# Patient Record
Sex: FEMALE | Race: WHITE | NOT HISPANIC OR LATINO | Employment: FULL TIME | URBAN - NONMETROPOLITAN AREA
[De-identification: names, ages, dates, MRNs, and addresses within clinical notes are randomized per-mention and may not be internally consistent; named-entity substitution may affect disease eponyms.]

---

## 2018-04-19 ENCOUNTER — HOSPITAL ENCOUNTER (OUTPATIENT)
Dept: ULTRASOUND IMAGING | Facility: HOSPITAL | Age: 43
Discharge: HOME | End: 2018-04-19
Payer: COMMERCIAL

## 2018-04-19 ENCOUNTER — TRANSCRIBE ORDERS (OUTPATIENT)
Dept: ADMINISTRATIVE | Facility: HOSPITAL | Age: 43
End: 2018-04-19

## 2018-04-19 DIAGNOSIS — O02.1 ABORTION, MISSED: ICD-10-CM

## 2018-04-19 DIAGNOSIS — O02.1 ABORTION, MISSED: Primary | ICD-10-CM

## 2018-04-19 DIAGNOSIS — Z34.90 PREGNANCY, UNSPECIFIED GESTATIONAL AGE: ICD-10-CM

## 2018-04-19 PROCEDURE — 76801 OB US < 14 WKS SINGLE FETUS: CPT

## 2018-04-27 ENCOUNTER — HOSPITAL ENCOUNTER (OUTPATIENT)
Dept: ULTRASOUND IMAGING | Facility: HOSPITAL | Age: 43
Discharge: HOME | End: 2018-04-27
Payer: COMMERCIAL

## 2018-04-27 DIAGNOSIS — Z34.90 PREGNANCY, UNSPECIFIED GESTATIONAL AGE: ICD-10-CM

## 2018-04-27 PROCEDURE — 76801 OB US < 14 WKS SINGLE FETUS: CPT

## 2018-05-01 ENCOUNTER — TRANSCRIBE ORDERS (OUTPATIENT)
Dept: ADMINISTRATIVE | Facility: HOSPITAL | Age: 43
End: 2018-05-01

## 2018-05-01 DIAGNOSIS — Z09 FOLLOW UP: Primary | ICD-10-CM

## 2018-05-02 ENCOUNTER — HOSPITAL ENCOUNTER (OUTPATIENT)
Dept: ULTRASOUND IMAGING | Facility: HOSPITAL | Age: 43
Discharge: HOME | End: 2018-05-02
Payer: COMMERCIAL

## 2018-05-02 DIAGNOSIS — Z09 FOLLOW UP: ICD-10-CM

## 2018-05-02 DIAGNOSIS — O02.1 MISSED ABORTION: ICD-10-CM

## 2018-05-02 PROCEDURE — 76801 OB US < 14 WKS SINGLE FETUS: CPT

## 2018-05-11 ENCOUNTER — TRANSCRIBE ORDERS (OUTPATIENT)
Dept: RADIOLOGY | Facility: MEDICAL CENTER | Age: 43
End: 2018-05-11

## 2018-05-11 ENCOUNTER — APPOINTMENT (OUTPATIENT)
Dept: LAB | Facility: MEDICAL CENTER | Age: 43
End: 2018-05-11
Payer: COMMERCIAL

## 2018-05-11 DIAGNOSIS — O02.1: Primary | ICD-10-CM

## 2018-05-11 DIAGNOSIS — O02.1: ICD-10-CM

## 2018-05-11 LAB — B-HCG SERPL-ACNC: 198 MIU/ML

## 2018-05-11 PROCEDURE — 36415 COLL VENOUS BLD VENIPUNCTURE: CPT

## 2018-05-11 PROCEDURE — 84702 CHORIONIC GONADOTROPIN TEST: CPT

## 2018-05-20 ENCOUNTER — TRANSCRIBE ORDERS (OUTPATIENT)
Dept: ADMINISTRATIVE | Facility: HOSPITAL | Age: 43
End: 2018-05-20

## 2018-05-20 ENCOUNTER — APPOINTMENT (OUTPATIENT)
Dept: LAB | Facility: HOSPITAL | Age: 43
End: 2018-05-20
Payer: COMMERCIAL

## 2018-05-20 DIAGNOSIS — O03.9 MISCARRIAGE: ICD-10-CM

## 2018-05-20 DIAGNOSIS — O03.9 MISCARRIAGE: Primary | ICD-10-CM

## 2018-05-20 LAB — B-HCG SERPL-ACNC: 41 MIU/ML

## 2018-05-20 PROCEDURE — 84702 CHORIONIC GONADOTROPIN TEST: CPT

## 2018-05-20 PROCEDURE — 36415 COLL VENOUS BLD VENIPUNCTURE: CPT

## 2018-05-27 ENCOUNTER — TRANSCRIBE ORDERS (OUTPATIENT)
Dept: ADMINISTRATIVE | Facility: HOSPITAL | Age: 43
End: 2018-05-27

## 2018-05-27 ENCOUNTER — APPOINTMENT (OUTPATIENT)
Dept: LAB | Facility: HOSPITAL | Age: 43
End: 2018-05-27
Payer: COMMERCIAL

## 2018-05-27 DIAGNOSIS — O02.1 MISSED ABORTION: ICD-10-CM

## 2018-05-27 DIAGNOSIS — O02.1 MISSED ABORTION: Primary | ICD-10-CM

## 2018-05-27 LAB — B-HCG SERPL-ACNC: 15 MIU/ML

## 2018-05-27 PROCEDURE — 36415 COLL VENOUS BLD VENIPUNCTURE: CPT

## 2018-05-27 PROCEDURE — 84702 CHORIONIC GONADOTROPIN TEST: CPT

## 2018-06-03 ENCOUNTER — TRANSCRIBE ORDERS (OUTPATIENT)
Dept: ADMINISTRATIVE | Facility: HOSPITAL | Age: 43
End: 2018-06-03

## 2018-06-03 ENCOUNTER — APPOINTMENT (OUTPATIENT)
Dept: LAB | Facility: HOSPITAL | Age: 43
End: 2018-06-03
Payer: COMMERCIAL

## 2018-06-03 DIAGNOSIS — Z3A.21 21 WEEKS GESTATION OF PREGNANCY: ICD-10-CM

## 2018-06-03 DIAGNOSIS — Z3A.21 21 WEEKS GESTATION OF PREGNANCY: Primary | ICD-10-CM

## 2018-06-03 LAB — B-HCG SERPL-ACNC: 5 MIU/ML

## 2018-06-03 PROCEDURE — 36415 COLL VENOUS BLD VENIPUNCTURE: CPT

## 2018-06-03 PROCEDURE — 84702 CHORIONIC GONADOTROPIN TEST: CPT

## 2018-06-06 ENCOUNTER — APPOINTMENT (OUTPATIENT)
Dept: LAB | Facility: HOSPITAL | Age: 43
End: 2018-06-06
Payer: COMMERCIAL

## 2018-06-06 ENCOUNTER — TRANSCRIBE ORDERS (OUTPATIENT)
Dept: ADMINISTRATIVE | Facility: HOSPITAL | Age: 43
End: 2018-06-06

## 2018-06-06 DIAGNOSIS — E28.2 POLYCYSTIC OVARIES: Primary | ICD-10-CM

## 2018-06-06 DIAGNOSIS — D68.59 PRIMARY HYPERCOAGULABLE STATE (HCC): ICD-10-CM

## 2018-06-06 DIAGNOSIS — Z11.59 SCREENING EXAMINATION FOR POLIOMYELITIS: ICD-10-CM

## 2018-06-06 DIAGNOSIS — E28.2 POLYCYSTIC OVARIES: ICD-10-CM

## 2018-06-06 DIAGNOSIS — Z11.8 SCREENING EXAMINATION FOR TRACHOMA: ICD-10-CM

## 2018-06-06 LAB
ABO GROUP BLD: NORMAL
ALBUMIN SERPL BCP-MCNC: 3.6 G/DL (ref 3.5–5)
ALP SERPL-CCNC: 53 U/L (ref 46–116)
ALT SERPL W P-5'-P-CCNC: 33 U/L (ref 12–78)
ANION GAP SERPL CALCULATED.3IONS-SCNC: 10 MMOL/L (ref 4–13)
AST SERPL W P-5'-P-CCNC: 16 U/L (ref 5–45)
BILIRUB SERPL-MCNC: 0.3 MG/DL (ref 0.2–1)
BLD GP AB SCN SERPL QL: NEGATIVE
BUN SERPL-MCNC: 11 MG/DL (ref 5–25)
CALCIUM SERPL-MCNC: 9.2 MG/DL (ref 8.3–10.1)
CHLORIDE SERPL-SCNC: 102 MMOL/L (ref 100–108)
CHOLEST SERPL-MCNC: 182 MG/DL (ref 50–200)
CO2 SERPL-SCNC: 27 MMOL/L (ref 21–32)
CREAT SERPL-MCNC: 0.65 MG/DL (ref 0.6–1.3)
EST. AVERAGE GLUCOSE BLD GHB EST-MCNC: 97 MG/DL
GFR SERPL CREATININE-BSD FRML MDRD: 108 ML/MIN/1.73SQ M
GLUCOSE P FAST SERPL-MCNC: 80 MG/DL (ref 65–99)
HBA1C MFR BLD: 5 % (ref 4.2–6.3)
HBV CORE AB SER QL: NORMAL
HBV SURFACE AB SER-ACNC: 22.2 MIU/ML
HCV AB SER QL: NORMAL
HDLC SERPL-MCNC: 48 MG/DL (ref 40–60)
INSULIN SERPL-ACNC: 32 MU/L (ref 3–25)
LDLC SERPL CALC-MCNC: 106 MG/DL (ref 0–100)
NONHDLC SERPL-MCNC: 134 MG/DL
POTASSIUM SERPL-SCNC: 4.4 MMOL/L (ref 3.5–5.3)
PROT SERPL-MCNC: 7.1 G/DL (ref 6.4–8.2)
RH BLD: POSITIVE
RUBV IGG SERPL IA-ACNC: >175 IU/ML
SODIUM SERPL-SCNC: 139 MMOL/L (ref 136–145)
SPECIMEN EXPIRATION DATE: NORMAL
TRIGL SERPL-MCNC: 139 MG/DL
VIT B12 SERPL-MCNC: 520 PG/ML (ref 100–900)

## 2018-06-06 PROCEDURE — 86850 RBC ANTIBODY SCREEN: CPT

## 2018-06-06 PROCEDURE — 86632 CHLAMYDIA IGM ANTIBODY: CPT

## 2018-06-06 PROCEDURE — 85306 CLOT INHIBIT PROT S FREE: CPT

## 2018-06-06 PROCEDURE — 86787 VARICELLA-ZOSTER ANTIBODY: CPT

## 2018-06-06 PROCEDURE — 84402 ASSAY OF FREE TESTOSTERONE: CPT

## 2018-06-06 PROCEDURE — 86645 CMV ANTIBODY IGM: CPT

## 2018-06-06 PROCEDURE — 86644 CMV ANTIBODY: CPT

## 2018-06-06 PROCEDURE — 82607 VITAMIN B-12: CPT

## 2018-06-06 PROCEDURE — 83498 ASY HYDROXYPROGESTERONE 17-D: CPT

## 2018-06-06 PROCEDURE — 86146 BETA-2 GLYCOPROTEIN ANTIBODY: CPT

## 2018-06-06 PROCEDURE — 83525 ASSAY OF INSULIN: CPT

## 2018-06-06 PROCEDURE — 80053 COMPREHEN METABOLIC PANEL: CPT

## 2018-06-06 PROCEDURE — 85670 THROMBIN TIME PLASMA: CPT

## 2018-06-06 PROCEDURE — 85705 THROMBOPLASTIN INHIBITION: CPT

## 2018-06-06 PROCEDURE — 87389 HIV-1 AG W/HIV-1&-2 AB AG IA: CPT

## 2018-06-06 PROCEDURE — 87491 CHLMYD TRACH DNA AMP PROBE: CPT

## 2018-06-06 PROCEDURE — 86900 BLOOD TYPING SEROLOGIC ABO: CPT

## 2018-06-06 PROCEDURE — 36415 COLL VENOUS BLD VENIPUNCTURE: CPT

## 2018-06-06 PROCEDURE — 86762 RUBELLA ANTIBODY: CPT

## 2018-06-06 PROCEDURE — 80061 LIPID PANEL: CPT

## 2018-06-06 PROCEDURE — 85303 CLOT INHIBIT PROT C ACTIVITY: CPT

## 2018-06-06 PROCEDURE — 86592 SYPHILIS TEST NON-TREP QUAL: CPT

## 2018-06-06 PROCEDURE — 85732 THROMBOPLASTIN TIME PARTIAL: CPT

## 2018-06-06 PROCEDURE — 87661 TRICHOMONAS VAGINALIS AMPLIF: CPT

## 2018-06-06 PROCEDURE — 83036 HEMOGLOBIN GLYCOSYLATED A1C: CPT

## 2018-06-06 PROCEDURE — 87591 N.GONORRHOEAE DNA AMP PROB: CPT

## 2018-06-06 PROCEDURE — 84403 ASSAY OF TOTAL TESTOSTERONE: CPT

## 2018-06-06 PROCEDURE — 86704 HEP B CORE ANTIBODY TOTAL: CPT

## 2018-06-06 PROCEDURE — 82627 DEHYDROEPIANDROSTERONE: CPT

## 2018-06-06 PROCEDURE — 86147 CARDIOLIPIN ANTIBODY EA IG: CPT

## 2018-06-06 PROCEDURE — 86901 BLOOD TYPING SEROLOGIC RH(D): CPT

## 2018-06-06 PROCEDURE — 86706 HEP B SURFACE ANTIBODY: CPT

## 2018-06-06 PROCEDURE — 81241 F5 GENE: CPT

## 2018-06-06 PROCEDURE — 86803 HEPATITIS C AB TEST: CPT

## 2018-06-06 PROCEDURE — 85613 RUSSELL VIPER VENOM DILUTED: CPT

## 2018-06-06 PROCEDURE — 86790 VIRUS ANTIBODY NOS: CPT

## 2018-06-06 PROCEDURE — 85300 ANTITHROMBIN III ACTIVITY: CPT

## 2018-06-06 PROCEDURE — 85210 CLOT FACTOR II PROTHROM SPEC: CPT

## 2018-06-07 LAB
CARDIOLIPIN IGA SER IA-ACNC: <9 APL U/ML (ref 0–11)
CARDIOLIPIN IGG SER IA-ACNC: <9 GPL U/ML (ref 0–14)
CARDIOLIPIN IGM SER IA-ACNC: <9 MPL U/ML (ref 0–12)
CHLAMYDIA DNA CVX QL NAA+PROBE: NORMAL
CMV IGG SERPL IA-ACNC: <0.6 U/ML (ref 0–0.59)
CMV IGM SERPL IA-ACNC: <30 AU/ML (ref 0–29.9)
DEPRECATED AT III PPP: 97 % OF NORMAL (ref 92–136)
DHEA-S SERPL-MCNC: 226.9 UG/DL (ref 41.2–243.7)
HTLV I+II AB SER QL IA: NEGATIVE
N GONORRHOEA DNA GENITAL QL NAA+PROBE: NORMAL
PROT C AG ACT/NOR PPP IA: 137 % OF NORMAL (ref 60–150)
RPR SER QL: NORMAL
RUBV IGM SER IA-ACNC: <20 AU/ML (ref 0–19.9)
TESTOST FREE SERPL-MCNC: 2.8 PG/ML (ref 0–4.2)
TESTOST SERPL-MCNC: 43 NG/DL (ref 8–48)
VZV IGG SER IA-ACNC: NORMAL
VZV IGM SER IA-ACNC: <0.91 INDEX (ref 0–0.9)

## 2018-06-08 LAB
17OHP SERPL-MCNC: 41 NG/DL
B2 GLYCOPROT1 IGA SER-ACNC: <9 GPI IGA UNITS (ref 0–25)
B2 GLYCOPROT1 IGG SER-ACNC: <9 GPI IGG UNITS (ref 0–20)
B2 GLYCOPROT1 IGM SER-ACNC: <9 GPI IGM UNITS (ref 0–32)
C TRACH IGM TITR SER IF: <0.8 INDEX (ref 0–0.7)
HIV 1+2 AB+HIV1 P24 AG SERPL QL IA: NORMAL
PROT S ACT/NOR PPP: 95 % (ref 63–140)
PROTHROM ACT/NOR PPP: 118 % (ref 50–154)

## 2018-06-10 LAB — T VAGINALIS RRNA SPEC QL NAA+PROBE: NEGATIVE

## 2018-06-11 LAB
APTT SCREEN TO CONFIRM RATIO: 1.09 RATIO (ref 0–1.4)
CONFIRM APTT/NORMAL: 36.6 SEC (ref 0–55)
F5 GENE MUT ANL BLD/T: NORMAL
LA PPP-IMP: NORMAL
SCREEN APTT: 35.5 SEC (ref 0–51.9)
SCREEN DRVVT: 37.4 SEC (ref 0–47)
THROMBIN TIME: 16.2 SEC (ref 0–23)

## 2018-10-11 ENCOUNTER — TRANSCRIBE ORDERS (OUTPATIENT)
Dept: ADMINISTRATIVE | Facility: HOSPITAL | Age: 43
End: 2018-10-11

## 2018-10-11 DIAGNOSIS — R52 PAIN: Primary | ICD-10-CM

## 2018-10-18 ENCOUNTER — HOSPITAL ENCOUNTER (OUTPATIENT)
Dept: MRI IMAGING | Facility: HOSPITAL | Age: 43
Discharge: HOME | End: 2018-10-18
Payer: COMMERCIAL

## 2018-10-18 DIAGNOSIS — R52 PAIN: ICD-10-CM

## 2018-10-18 PROCEDURE — 72197 MRI PELVIS W/O & W/DYE: CPT

## 2018-10-18 PROCEDURE — A9585 GADOBUTROL INJECTION: HCPCS | Performed by: RADIOLOGY

## 2018-10-18 RX ADMIN — GADOBUTROL 8 ML: 604.72 INJECTION INTRAVENOUS at 18:34

## 2020-02-25 ENCOUNTER — TRANSCRIBE ORDERS (OUTPATIENT)
Dept: ADMINISTRATIVE | Facility: HOSPITAL | Age: 45
End: 2020-02-25

## 2020-02-25 ENCOUNTER — APPOINTMENT (OUTPATIENT)
Dept: LAB | Facility: HOSPITAL | Age: 45
End: 2020-02-25
Payer: COMMERCIAL

## 2020-02-25 DIAGNOSIS — O09.92 SUPERVISION OF HIGH RISK PREGNANCY IN SECOND TRIMESTER: Primary | ICD-10-CM

## 2020-02-25 DIAGNOSIS — O09.92 SUPERVISION OF HIGH RISK PREGNANCY IN SECOND TRIMESTER: ICD-10-CM

## 2020-02-25 LAB
ERYTHROCYTE [DISTWIDTH] IN BLOOD BY AUTOMATED COUNT: 12.7 % (ref 11.6–15.1)
GLUCOSE 1H P 50 G GLC PO SERPL-MCNC: 167 MG/DL
HCT VFR BLD AUTO: 37.3 % (ref 34.8–46.1)
HGB BLD-MCNC: 12.1 G/DL (ref 11.5–15.4)
MCH RBC QN AUTO: 31.6 PG (ref 26.8–34.3)
MCHC RBC AUTO-ENTMCNC: 32.4 G/DL (ref 31.4–37.4)
MCV RBC AUTO: 97 FL (ref 82–98)
PLATELET # BLD AUTO: 249 THOUSANDS/UL (ref 149–390)
PMV BLD AUTO: 9.5 FL (ref 8.9–12.7)
RBC # BLD AUTO: 3.83 MILLION/UL (ref 3.81–5.12)
RPR SER QL: NORMAL
WBC # BLD AUTO: 10.32 THOUSAND/UL (ref 4.31–10.16)

## 2020-02-25 PROCEDURE — 85027 COMPLETE CBC AUTOMATED: CPT

## 2020-02-25 PROCEDURE — 86592 SYPHILIS TEST NON-TREP QUAL: CPT

## 2020-02-25 PROCEDURE — 36415 COLL VENOUS BLD VENIPUNCTURE: CPT

## 2020-02-25 PROCEDURE — 82950 GLUCOSE TEST: CPT

## 2020-03-02 ENCOUNTER — OFFICE VISIT (OUTPATIENT)
Dept: FAMILY MEDICINE CLINIC | Facility: CLINIC | Age: 45
End: 2020-03-02
Payer: COMMERCIAL

## 2020-03-02 VITALS
HEIGHT: 70 IN | DIASTOLIC BLOOD PRESSURE: 90 MMHG | SYSTOLIC BLOOD PRESSURE: 162 MMHG | BODY MASS INDEX: 34.36 KG/M2 | WEIGHT: 240 LBS

## 2020-03-02 DIAGNOSIS — Z34.90 INTRAUTERINE PREGNANCY: Primary | ICD-10-CM

## 2020-03-02 DIAGNOSIS — I10 ESSENTIAL HYPERTENSION: ICD-10-CM

## 2020-03-02 PROCEDURE — 1036F TOBACCO NON-USER: CPT | Performed by: FAMILY MEDICINE

## 2020-03-02 PROCEDURE — 3077F SYST BP >= 140 MM HG: CPT | Performed by: FAMILY MEDICINE

## 2020-03-02 PROCEDURE — 3008F BODY MASS INDEX DOCD: CPT | Performed by: FAMILY MEDICINE

## 2020-03-02 PROCEDURE — 3080F DIAST BP >= 90 MM HG: CPT | Performed by: FAMILY MEDICINE

## 2020-03-02 PROCEDURE — 99213 OFFICE O/P EST LOW 20 MIN: CPT | Performed by: FAMILY MEDICINE

## 2020-03-02 RX ORDER — ASCORBIC ACID 500 MG
TABLET ORAL
COMMUNITY

## 2020-03-02 RX ORDER — LABETALOL 100 MG/1
100 TABLET, FILM COATED ORAL 2 TIMES DAILY
COMMUNITY
Start: 2019-11-11 | End: 2021-08-17

## 2020-03-02 RX ORDER — MONTELUKAST SODIUM 10 MG/1
1 TABLET ORAL DAILY
COMMUNITY
End: 2020-03-02 | Stop reason: ALTCHOICE

## 2020-03-02 RX ORDER — PNV NO.121/IRON/FOLIC ACID 28MG-0.8MG
1 TABLET ORAL DAILY
COMMUNITY
End: 2020-03-02

## 2020-03-02 RX ORDER — UBIDECARENONE 10 MG
600 CAPSULE ORAL DAILY
COMMUNITY

## 2020-03-02 RX ORDER — DOXYCYCLINE HYCLATE 100 MG/1
CAPSULE ORAL
COMMUNITY
Start: 2013-03-04 | End: 2020-03-02

## 2020-03-02 RX ORDER — PNV NO.95/FERROUS FUM/FOLIC AC 28MG-0.8MG
2 TABLET ORAL DAILY
COMMUNITY

## 2020-03-02 RX ORDER — UREA 10 %
400 LOTION (ML) TOPICAL DAILY
COMMUNITY

## 2020-03-02 RX ORDER — MOMETASONE FUROATE 50 UG/1
2 SPRAY, METERED NASAL DAILY
COMMUNITY
Start: 2012-10-01 | End: 2020-03-02 | Stop reason: ALTCHOICE

## 2020-03-02 RX ORDER — LEVOTHYROXINE SODIUM 88 UG/1
88 TABLET ORAL DAILY
COMMUNITY
Start: 2019-11-26

## 2020-03-02 RX ORDER — PNV NO.95/FERROUS FUM/FOLIC AC 28MG-0.8MG
1 TABLET ORAL DAILY
COMMUNITY

## 2020-03-02 RX ORDER — VALSARTAN AND HYDROCHLOROTHIAZIDE 80; 12.5 MG/1; MG/1
1 TABLET, FILM COATED ORAL DAILY
COMMUNITY
Start: 2012-10-01 | End: 2020-03-02

## 2020-03-02 NOTE — PROGRESS NOTES
Assessment/Plan:  Patient will continue checking her blood pressure she has already watching her salt she exercises every day on an elliptical so she is doing all the right things on she does have any symptoms of edema I told her to continue close follow-up on her blood pressure and again her OB does get a readings on a weekly basis so they are aware of this not going to change any of her medications at the present time and I actually would prefer 0 be due the change if there is any necessary in her labetalol dosage    Problem List Items Addressed This Visit     None      Visit Diagnoses     Intrauterine pregnancy    -  Primary    Essential hypertension               Diagnoses and all orders for this visit:    Intrauterine pregnancy    Essential hypertension        No problem-specific Assessment & Plan notes found for this encounter  Subjective:      Patient ID: Selam Monzon is a 50 y o  female  Patient here for a routine blood pressure checkup she was concerned because her blood pressures have been up a little bit recently but she is actually following with her Ob group and checking her pressures twice daily at home her blood pressures are better than they are here there 140 over her 88 or so today's blood pressure slightly elevated and after we talked for a while pressure is starting to come down she does not have any symptoms of preeclampsia she has no edema and has no headaches and to the best of my knowledge is not having any protein in her urine her OB checks that this is a patient who has had infertility issues and this is a beginning trimester intrauterine pregnancy with complications of a fibroid uterus      The following portions of the patient's history were reviewed and updated as appropriate:   She has no past medical history on file  ,  does not have a problem list on file  ,   has no past surgical history on file  ,  family history is not on file  ,   reports that she has never smoked  She has never used smokeless tobacco  She reports that she drank alcohol  Her drug history is not on file  ,  is allergic to aspirin; other; pollen extract; and molds & smuts     Current Outpatient Medications   Medication Sig Dispense Refill    ascorbic acid (VITAMIN C) 500 mg tablet Take by mouth      ASPIRIN 81 PO Take 81 mg by mouth daily       Coenzyme Q10 10 MG capsule Take 600 mg by mouth daily      folic acid (FOLVITE) 216 MCG tablet Take 400 mcg by mouth daily      labetalol (NORMODYNE) 100 mg tablet Take 100 mg by mouth 2 (two) times a day      levothyroxine 88 mcg tablet Take 88 mcg by mouth daily      Omega-3 Fatty Acids (FISH OIL OMEGA-3) 1000 MG CAPS Take 2 g by mouth daily      Prenatal Vit-Fe Fumarate-FA (PRENATAL VITAMIN) 27-0 8 MG TABS Take 1 tablet by mouth daily       No current facility-administered medications for this visit  Review of Systems   Constitutional: Negative for activity change, appetite change, diaphoresis, fatigue and fever  HENT: Negative  Eyes: Negative  Respiratory: Negative for apnea, cough, chest tightness, shortness of breath and wheezing  Cardiovascular: Negative for chest pain, palpitations and leg swelling  Gastrointestinal: Negative for abdominal distention, abdominal pain, anal bleeding, constipation, diarrhea, nausea and vomiting  Endocrine: Negative for cold intolerance, heat intolerance, polydipsia, polyphagia and polyuria  Genitourinary: Negative for difficulty urinating, dysuria, flank pain, hematuria and urgency  Musculoskeletal: Negative for arthralgias, back pain, gait problem, joint swelling and myalgias  Skin: Negative for color change, rash and wound  Allergic/Immunologic: Negative for environmental allergies, food allergies and immunocompromised state  Neurological: Negative for dizziness, seizures, syncope, speech difficulty, numbness and headaches  Hematological: Negative for adenopathy   Does not bruise/bleed easily  Psychiatric/Behavioral: Negative for agitation, behavioral problems, hallucinations, sleep disturbance and suicidal ideas  Objective:  Vitals:    03/02/20 1057   BP: 162/90   BP Location: Left arm   Patient Position: Sitting   Cuff Size: Standard   Weight: 109 kg (240 lb)   Height: 5' 10" (1 778 m)     Body mass index is 34 44 kg/m²  Physical Exam   Constitutional: She is oriented to person, place, and time  She appears well-developed and well-nourished  No distress  HENT:   Head: Normocephalic  Right Ear: External ear normal    Left Ear: External ear normal    Nose: Nose normal    Mouth/Throat: Oropharynx is clear and moist    Eyes: Pupils are equal, round, and reactive to light  Conjunctivae and EOM are normal  Right eye exhibits no discharge  Left eye exhibits no discharge  No scleral icterus  Neck: Normal range of motion  No tracheal deviation present  No thyromegaly present  Cardiovascular: Normal rate, regular rhythm and normal heart sounds  Exam reveals no gallop and no friction rub  No murmur heard  Pulmonary/Chest: Effort normal and breath sounds normal  No respiratory distress  She has no wheezes  Abdominal: Soft  Bowel sounds are normal  She exhibits no mass  There is no tenderness  There is no guarding  Musculoskeletal: She exhibits no edema or deformity  Lymphadenopathy:     She has no cervical adenopathy  Neurological: She is alert and oriented to person, place, and time  No cranial nerve deficit  Skin: Skin is warm and dry  No rash noted  She is not diaphoretic  No erythema  Psychiatric: She has a normal mood and affect   Thought content normal

## 2020-03-03 ENCOUNTER — APPOINTMENT (OUTPATIENT)
Dept: LAB | Facility: HOSPITAL | Age: 45
End: 2020-03-03
Payer: COMMERCIAL

## 2020-03-03 ENCOUNTER — TRANSCRIBE ORDERS (OUTPATIENT)
Dept: ADMINISTRATIVE | Facility: HOSPITAL | Age: 45
End: 2020-03-03

## 2020-03-03 DIAGNOSIS — O99.810 ABNORMAL GLUCOSE IN PREGNANCY, ANTEPARTUM: Primary | ICD-10-CM

## 2020-03-03 DIAGNOSIS — O99.810 ABNORMAL GLUCOSE IN PREGNANCY, ANTEPARTUM: ICD-10-CM

## 2020-03-03 LAB
GLUCOSE 1H P 100 G GLC PO SERPL-MCNC: 231 MG/DL (ref 65–179)
GLUCOSE 2H P 100 G GLC PO SERPL-MCNC: 193 MG/DL (ref 65–154)
GLUCOSE 3H P 100 G GLC PO SERPL-MCNC: 83 MG/DL (ref 65–139)
GLUCOSE P FAST SERPL-MCNC: 86 MG/DL (ref 65–99)

## 2020-03-03 PROCEDURE — 82952 GTT-ADDED SAMPLES: CPT

## 2020-03-03 PROCEDURE — 82951 GLUCOSE TOLERANCE TEST (GTT): CPT

## 2020-03-03 PROCEDURE — 36415 COLL VENOUS BLD VENIPUNCTURE: CPT

## 2020-03-25 ENCOUNTER — TRANSCRIBE ORDERS (OUTPATIENT)
Dept: ADMINISTRATIVE | Facility: HOSPITAL | Age: 45
End: 2020-03-25

## 2020-03-25 ENCOUNTER — APPOINTMENT (OUTPATIENT)
Dept: LAB | Facility: HOSPITAL | Age: 45
End: 2020-03-25
Payer: COMMERCIAL

## 2020-03-25 DIAGNOSIS — O99.280 MATERNAL THYROID DYSFUNCTION, ANTEPARTUM: Primary | ICD-10-CM

## 2020-03-25 DIAGNOSIS — E07.9 MATERNAL THYROID DYSFUNCTION, ANTEPARTUM: Primary | ICD-10-CM

## 2020-03-25 DIAGNOSIS — E03.9 MYXEDEMA HEART DISEASE: ICD-10-CM

## 2020-03-25 DIAGNOSIS — I51.9 MYXEDEMA HEART DISEASE: ICD-10-CM

## 2020-03-25 DIAGNOSIS — O99.280 MATERNAL THYROID DYSFUNCTION, ANTEPARTUM: ICD-10-CM

## 2020-03-25 DIAGNOSIS — E07.9 MATERNAL THYROID DYSFUNCTION, ANTEPARTUM: ICD-10-CM

## 2020-03-25 LAB — TSH SERPL DL<=0.05 MIU/L-ACNC: 1.6 UIU/ML (ref 0.36–3.74)

## 2020-03-25 PROCEDURE — 36415 COLL VENOUS BLD VENIPUNCTURE: CPT

## 2020-03-25 PROCEDURE — 84443 ASSAY THYROID STIM HORMONE: CPT

## 2020-06-20 ENCOUNTER — TRANSCRIBE ORDERS (OUTPATIENT)
Dept: ADMINISTRATIVE | Facility: HOSPITAL | Age: 45
End: 2020-06-20

## 2020-06-20 ENCOUNTER — APPOINTMENT (OUTPATIENT)
Dept: LAB | Facility: HOSPITAL | Age: 45
End: 2020-06-20
Payer: COMMERCIAL

## 2020-06-20 DIAGNOSIS — E03.9 PRIMARY HYPOTHYROIDISM: ICD-10-CM

## 2020-06-20 DIAGNOSIS — E03.9 PRIMARY HYPOTHYROIDISM: Primary | ICD-10-CM

## 2020-06-20 LAB — TSH SERPL DL<=0.05 MIU/L-ACNC: 1.75 UIU/ML (ref 0.36–3.74)

## 2020-06-20 PROCEDURE — 84443 ASSAY THYROID STIM HORMONE: CPT

## 2020-06-20 PROCEDURE — 36415 COLL VENOUS BLD VENIPUNCTURE: CPT

## 2020-06-28 ENCOUNTER — TRANSCRIBE ORDERS (OUTPATIENT)
Dept: ADMINISTRATIVE | Facility: HOSPITAL | Age: 45
End: 2020-06-28

## 2020-06-28 ENCOUNTER — APPOINTMENT (OUTPATIENT)
Dept: LAB | Facility: HOSPITAL | Age: 45
End: 2020-06-28
Payer: COMMERCIAL

## 2020-06-28 DIAGNOSIS — Z86.32 HISTORY OF GESTATIONAL DIABETES: ICD-10-CM

## 2020-06-28 DIAGNOSIS — Z86.32 HISTORY OF GESTATIONAL DIABETES: Primary | ICD-10-CM

## 2020-06-28 LAB
GLUCOSE 2H P 75 G GLC PO SERPL-MCNC: 96 MG/DL (ref 65–139)
GLUCOSE P FAST SERPL-MCNC: 94 MG/DL (ref 65–99)
GLUCOSE SERPL-MCNC: 161 MG/DL (ref 65–140)

## 2020-06-28 PROCEDURE — 82947 ASSAY GLUCOSE BLOOD QUANT: CPT

## 2020-06-28 PROCEDURE — 36415 COLL VENOUS BLD VENIPUNCTURE: CPT

## 2020-06-28 PROCEDURE — 82950 GLUCOSE TEST: CPT

## 2020-11-02 ENCOUNTER — IMMUNIZATIONS (OUTPATIENT)
Dept: FAMILY MEDICINE CLINIC | Facility: CLINIC | Age: 45
End: 2020-11-02
Payer: COMMERCIAL

## 2020-11-02 DIAGNOSIS — Z23 NEED FOR INFLUENZA VACCINATION: Primary | ICD-10-CM

## 2020-11-02 PROCEDURE — 90686 IIV4 VACC NO PRSV 0.5 ML IM: CPT | Performed by: FAMILY MEDICINE

## 2020-11-02 PROCEDURE — 90471 IMMUNIZATION ADMIN: CPT | Performed by: FAMILY MEDICINE

## 2021-03-29 ENCOUNTER — TELEMEDICINE (OUTPATIENT)
Dept: FAMILY MEDICINE CLINIC | Facility: CLINIC | Age: 46
End: 2021-03-29
Payer: COMMERCIAL

## 2021-03-29 VITALS — HEIGHT: 70 IN | TEMPERATURE: 97.5 F | WEIGHT: 220 LBS | BODY MASS INDEX: 31.5 KG/M2

## 2021-03-29 DIAGNOSIS — J30.1 SEASONAL ALLERGIC RHINITIS DUE TO POLLEN: Primary | ICD-10-CM

## 2021-03-29 DIAGNOSIS — J01.90 ACUTE SINUSITIS WITH SYMPTOMS > 10 DAYS: ICD-10-CM

## 2021-03-29 DIAGNOSIS — Z12.31 ENCOUNTER FOR SCREENING MAMMOGRAM FOR MALIGNANT NEOPLASM OF BREAST: ICD-10-CM

## 2021-03-29 PROCEDURE — 3008F BODY MASS INDEX DOCD: CPT | Performed by: FAMILY MEDICINE

## 2021-03-29 PROCEDURE — 99213 OFFICE O/P EST LOW 20 MIN: CPT | Performed by: FAMILY MEDICINE

## 2021-03-29 PROCEDURE — 1036F TOBACCO NON-USER: CPT | Performed by: FAMILY MEDICINE

## 2021-03-29 RX ORDER — CARVEDILOL 25 MG/1
25 TABLET ORAL 2 TIMES DAILY WITH MEALS
COMMUNITY

## 2021-03-29 RX ORDER — LISINOPRIL 5 MG/1
10 TABLET ORAL 2 TIMES DAILY
COMMUNITY

## 2021-03-29 RX ORDER — PREDNISONE 10 MG/1
TABLET ORAL
Qty: 10 TABLET | Refills: 0 | Status: SHIPPED | OUTPATIENT
Start: 2021-03-29

## 2021-03-29 RX ORDER — FLUTICASONE PROPIONATE 50 MCG
1 SPRAY, SUSPENSION (ML) NASAL DAILY
Qty: 1 BOTTLE | Refills: 3 | Status: SHIPPED | OUTPATIENT
Start: 2021-03-29

## 2021-03-29 RX ORDER — AMOXICILLIN AND CLAVULANATE POTASSIUM 875; 125 MG/1; MG/1
1 TABLET, FILM COATED ORAL EVERY 12 HOURS SCHEDULED
Qty: 14 TABLET | Refills: 0 | Status: SHIPPED | OUTPATIENT
Start: 2021-03-29 | End: 2021-04-06 | Stop reason: SDUPTHER

## 2021-03-29 RX ORDER — ACETAMINOPHEN AND CODEINE PHOSPHATE 120; 12 MG/5ML; MG/5ML
0.35 SOLUTION ORAL DAILY
COMMUNITY
Start: 2020-06-03 | End: 2021-06-03

## 2021-03-29 RX ORDER — FEXOFENADINE HCL 180 MG/1
180 TABLET ORAL DAILY
Qty: 30 TABLET | Refills: 3 | Status: SHIPPED | OUTPATIENT
Start: 2021-03-29

## 2021-03-29 NOTE — PROGRESS NOTES
Virtual Regular Visit      Assessment/Plan:    Problem List Items Addressed This Visit     None      Visit Diagnoses     Encounter for screening mammogram for malignant neoplasm of breast    -  Primary    Relevant Orders    Mammo screening bilateral w 3d & cad               Reason for visit is   Chief Complaint   Patient presents with    Nasal Congestion        Encounter provider Junior Queen DO    Provider located at [address] [address]      Recent Visits  No visits were found meeting these conditions  Showing recent visits within past 7 days and meeting all other requirements     Today's Visits  Date Type Provider Dept   03/29/21 Telemedicine Junior Queen DO [facility]   Showing today's visits and meeting all other requirements     Future Appointments  No visits were found meeting these conditions  Showing future appointments within next 150 days and meeting all other requirements        The patient was identified by name and date of birth  Lokesh Gaona was informed that this is a telemedicine visit and that the visit is being conducted through [employer] and patient was informed that this is not a secure, HIPAA-compliant platform  She agrees to proceed     My office door was closed  No one else was in the room  She acknowledged consent and understanding of privacy and security of the video platform  The patient has agreed to participate and understands they can discontinue the visit at any time  Patient is aware this is a billable service  Subjective  Lokesh Gaona is a 48 y o  female  With acute sinusitis superimposed upon seasonal allergic rhinitis more than 3 weeks duration          Patient the is hit expect here for a video visit because of the [place] pandemic on patient has been sick with sinus congestion for well over 10 days she has been using a [place] she sinus  without relief she has done had any fever no chills she can't  smell or food but she is able to taste her food symptoms have been present for well over 2 weeks she tried over-the-counter antihistamines with no relief she describes her nasal discharge as purulence low-grade cough no shortness of breath she has not been exposed to COVID to the best of her knowledge       No past medical history on file  Past Surgical History:   Procedure Laterality Date    SINUS SURGERY  2000       Current Outpatient Medications   Medication Sig Dispense Refill    ascorbic acid (VITAMIN C) 500 mg tablet Take by mouth      ASPIRIN 81 PO Take 81 mg by mouth daily       carvedilol (COREG) 25 mg tablet Take 25 mg by mouth 2 (two) times a day with meals      Coenzyme Q10 10 MG capsule Take 600 mg by mouth daily      folic acid (FOLVITE) 413 MCG tablet Take 400 mcg by mouth daily      labetalol (NORMODYNE) 100 mg tablet Take 100 mg by mouth 2 (two) times a day      lisinopril (ZESTRIL) 5 mg tablet Take 5 mg by mouth daily      norethindrone (MICRONOR) 0 35 MG tablet Take 0 35 mg by mouth daily      Omega-3 Fatty Acids (FISH OIL OMEGA-3) 1000 MG CAPS Take 2 g by mouth daily      Prenatal Vit-Fe Fumarate-FA (PRENATAL VITAMIN) 27-0 8 MG TABS Take 1 tablet by mouth daily      levothyroxine 88 mcg tablet Take 88 mcg by mouth daily       No current facility-administered medications for this visit  Allergies   Allergen Reactions    Aspirin Anaphylaxis and Hives     Upset stomach  Pt allergic to full strength aspirin; able to take baby aspirin without problems      Other Anaphylaxis     Allergic to cat dander--Swollen eyes, runny nose, difficulty breathing    Pollen Extract      Nasal congestion, swollen eyes    Molds & Smuts      Nasal congestion, swollen eyes       Review of Systems   Constitutional: Negative for activity change, appetite change, diaphoresis, fatigue and fever  HENT: Positive for sinus pressure, sinus pain, sneezing and sore throat      Eyes: Negative  Respiratory: Negative for apnea, cough, chest tightness, shortness of breath and wheezing  Cardiovascular: Negative for chest pain, palpitations and leg swelling  History of nonischemic cardiomyopathy   Gastrointestinal: Negative for abdominal distention, abdominal pain, anal bleeding, constipation, diarrhea, nausea and vomiting  Endocrine: Negative for cold intolerance, heat intolerance, polydipsia, polyphagia and polyuria  Genitourinary: Negative for difficulty urinating, dysuria, flank pain, hematuria and urgency  Musculoskeletal: Negative for arthralgias, back pain, gait problem, joint swelling and myalgias  Skin: Negative for color change, rash and wound  Allergic/Immunologic: Negative for environmental allergies, food allergies and immunocompromised state  Neurological: Negative for dizziness, seizures, syncope, speech difficulty, numbness and headaches  Hematological: Negative for adenopathy  Does not bruise/bleed easily  Psychiatric/Behavioral: Negative for agitation, behavioral problems, hallucinations, sleep disturbance and suicidal ideas  Video Exam    Vitals:    03/29/21 1122   Temp: 97 5 °F (36 4 °C)   Weight: 99 8 kg (220 lb)   Height: 5' 10" (1 778 m)       Physical Exam  Constitutional:       General: She is not in acute distress  Appearance: She is well-developed  She is obese  She is not diaphoretic  HENT:      Head: Normocephalic  Right Ear: External ear normal       Left Ear: External ear normal       Nose: Nose normal    Eyes:      General: No scleral icterus  Right eye: No discharge  Left eye: No discharge  Conjunctiva/sclera: Conjunctivae normal       Pupils: Pupils are equal, round, and reactive to light  Neck:      Musculoskeletal: Normal range of motion  Thyroid: No thyromegaly  Trachea: No tracheal deviation  Cardiovascular:      Heart sounds: No murmur  No friction rub  No gallop      Pulmonary: Effort: Pulmonary effort is normal  No respiratory distress  Breath sounds: Normal breath sounds  No wheezing  Abdominal:      General: Bowel sounds are normal       Palpations: Abdomen is soft  There is no mass  Tenderness: There is no abdominal tenderness  There is no guarding  Musculoskeletal:         General: No deformity  Lymphadenopathy:      Cervical: No cervical adenopathy  Skin:     General: Skin is warm and dry  Findings: No erythema or rash  Neurological:      Mental Status: She is alert and oriented to person, place, and time  Cranial Nerves: No cranial nerve deficit  Psychiatric:         Thought Content: Thought content normal           I spent 10 minutes directly with the patient during this visit      VIRTUAL VISIT DISCLAIMER    Lucafrancine Nascimento acknowledges that she has consented to an online visit or consultation  She understands that the online visit is based solely on information provided by her, and that, in the absence of a face-to-face physical evaluation by the physician, the diagnosis she receives is both limited and provisional in terms of accuracy and completeness  This is not intended to replace a full medical face-to-face evaluation by the physician  Luca Nascimento understands and accepts these terms

## 2021-04-06 ENCOUNTER — TELEPHONE (OUTPATIENT)
Dept: FAMILY MEDICINE CLINIC | Facility: CLINIC | Age: 46
End: 2021-04-06

## 2021-04-06 DIAGNOSIS — J01.90 ACUTE SINUSITIS WITH SYMPTOMS > 10 DAYS: ICD-10-CM

## 2021-04-06 RX ORDER — AMOXICILLIN AND CLAVULANATE POTASSIUM 875; 125 MG/1; MG/1
1 TABLET, FILM COATED ORAL EVERY 12 HOURS SCHEDULED
Qty: 14 TABLET | Refills: 0 | Status: SHIPPED | OUTPATIENT
Start: 2021-04-06 | End: 2021-04-13

## 2021-04-06 NOTE — TELEPHONE ENCOUNTER
Telemed appt 3/29/21  Rx'd Augmentin, Fexofenadine, Fluticasone, Prednisone    Not fully over infection about 75% - Not sure if you would be willing to refill Rx's or what recommendations you would have? Michelle Napier

## 2021-08-16 PROBLEM — I50.22 CHRONIC HFREF (HEART FAILURE WITH REDUCED EJECTION FRACTION) (HCC): Status: ACTIVE | Noted: 2020-09-30

## 2021-08-16 PROBLEM — I44.7 LEFT BUNDLE BRANCH BLOCK: Status: ACTIVE | Noted: 2020-09-02

## 2021-08-16 PROBLEM — I25.10 CORONARY ARTERY DISEASE INVOLVING NATIVE CORONARY ARTERY OF NATIVE HEART WITHOUT ANGINA PECTORIS: Status: ACTIVE | Noted: 2020-12-07

## 2021-08-17 ENCOUNTER — OFFICE VISIT (OUTPATIENT)
Dept: CARDIOLOGY CLINIC | Facility: HOSPITAL | Age: 46
End: 2021-08-17
Payer: COMMERCIAL

## 2021-08-17 VITALS
DIASTOLIC BLOOD PRESSURE: 80 MMHG | WEIGHT: 235 LBS | HEART RATE: 66 BPM | HEIGHT: 70 IN | BODY MASS INDEX: 33.64 KG/M2 | SYSTOLIC BLOOD PRESSURE: 118 MMHG

## 2021-08-17 DIAGNOSIS — I44.7 LEFT BUNDLE BRANCH BLOCK: ICD-10-CM

## 2021-08-17 DIAGNOSIS — I25.10 CORONARY ARTERY DISEASE INVOLVING NATIVE CORONARY ARTERY OF NATIVE HEART WITHOUT ANGINA PECTORIS: ICD-10-CM

## 2021-08-17 DIAGNOSIS — I50.22 CHRONIC HFREF (HEART FAILURE WITH REDUCED EJECTION FRACTION) (HCC): ICD-10-CM

## 2021-08-17 DIAGNOSIS — I10 ESSENTIAL HYPERTENSION: ICD-10-CM

## 2021-08-17 PROCEDURE — 99204 OFFICE O/P NEW MOD 45 MIN: CPT | Performed by: INTERNAL MEDICINE

## 2021-08-17 PROCEDURE — 1036F TOBACCO NON-USER: CPT | Performed by: INTERNAL MEDICINE

## 2021-08-17 PROCEDURE — 3008F BODY MASS INDEX DOCD: CPT | Performed by: INTERNAL MEDICINE

## 2021-08-17 PROCEDURE — 93000 ELECTROCARDIOGRAM COMPLETE: CPT | Performed by: INTERNAL MEDICINE

## 2021-08-17 RX ORDER — ATORVASTATIN CALCIUM 20 MG/1
20 TABLET, FILM COATED ORAL DAILY
COMMUNITY

## 2021-08-17 NOTE — PROGRESS NOTES
Cardiology Consultation     Diana Hanson  [id number]  1975  [address] [place] [address] [facility] [address] [address]      1  Peripartum cardiomyopathy  POCT ECG    Echo complete with contrast if indicated   2  Chronic HFrEF (heart failure with reduced ejection fraction) ([address] )     3  Left bundle branch block     4  Coronary artery disease involving native coronary artery of native heart without angina pectoris     5  Essential hypertension         Discussion/Summary:  Mrs Judye Dakins is a very pleasant 41-year-old female who presents to the office today for a second opinion  She has a known history of a peripartum cardiomyopathy and strongly desires another pregnancy  She has known persistent LV dysfunction with her most recent echo back in January revealing an EF of 40% despite optimal GDMT  We had a long discussion in the office today regarding the risks of proceeding with another pregnancy given her persistent LV dysfunction  We talked about risk of persistent or worsening LV function, heart failure, need for advanced therapies, arrhythmia and risk of death  I did advise against pregnancy given these issues  If she does choose to proceed with pregnancy we also discussed the fact that she would need to discontinue her ACE-inhibitor and I would recommend a repeat echocardiogram prior to becoming pregnant  She has no signs or symptoms of congestive heart failure  She is euvolemic on no diuretics  Her blood pressure is well controlled in the office today  We discussed repeating an echocardiogram for re-evaluation of her LV function given  titration of guideline directed medical therapy since her last assessment  A prescription was provided  She is maintained on statin therapy in the setting of her known nonobstructive coronary artery disease      She will call the office should she choose to follow up  Otherwise she will follow-up with [facility]  History of Present Illness:  Mrs Mena Collazo is a very pleasant 23-year-old female who presents to the office today for a second opinion  Back in September of 2020 she was evaluated by cardiology at [facility]   She states six months into her pregnancy she developed hypertension  She was started on labetalol  Postpartum she developed severe hypertension  This prompted a cardiology evaluation  An ECG was performed which revealed a left bundle-branch block  Due to these issues she was sent for an echocardiogram   The echocardiogram revealed LV dysfunction with an EF estimated at 35 to 40%  She was placed on a medication regimen of carvedilol along with lisinopril  She denies any symptoms of heart failure at the time  She underwent an event monitor which per the patient was unremarkable  She underwent a coronary CTA to rule out obstructive coronary artery disease as a contributor  This revealed calcified nonobstructive plaque in all three coronary vessels and her left main coronary artery  It was felt that the etiology of her cardiomyopathy was peripartum  More recently back in March she was seen by her cardiologist and her GDMT was titrated  She feels this is making her feel unwell  She feels like she is in a fog  She denies any overt lightheadedness or dizziness  She is very active  She walks three miles per day on flat ground on an outdoor track  In doing so she denies any exertional shortness of breath or chest pain  She denies any signs or symptoms of congestive heart failure including increasing lower extremity edema, paroxysmal nocturnal dyspnea, orthopnea, acute weight gain or increasing abdominal girth  She denies syncope or presyncope  She denies symptoms of claudication  She denies palpitations      She states that prior to her pregnancy last year she had been attempting to get pregnant via IVF  She reports two pregnancies both of which resulted in miscarriages  She was then told she had fibroids and underwent a myomectomy and conceived her 13month-old daughter via IVF  She strongly desires another pregnancy  She also reports a history of gestational diabetes  Patient Active Problem List   Diagnosis    Chronic HFrEF (heart failure with reduced ejection fraction) [facility]    Coronary artery disease involving native coronary artery of native heart without angina pectoris    Essential hypertension    Left bundle branch block    Peripartum cardiomyopathy     Past Medical History:   Diagnosis Date    H/O:      History of myomectomy      Social History     Socioeconomic History    Marital status: /Civil Union     Spouse name: Not on file    Number of children: Not on file    Years of education: Not on file    Highest education level: Not on file   Occupational History    Not on file   Tobacco Use    Smoking status: Never Smoker    Smokeless tobacco: Never Used    Tobacco comment: Former smoker per [employer]   Substance and Sexual Activity    Alcohol use: Not Currently     Comment: drinks socially per [employer]    Drug use: Never    Sexual activity: Not on file   Other Topics Concern    Not on file   Social History Narrative    Not on file     Social Determinants of Health     Financial Resource Strain:     Difficulty of Paying Living Expenses:    Food Insecurity:     Worried About Running Out of Food in the Last Year:     [address] in the Last Year:    Transportation Needs:     Lack of Transportation (Medical):      Lack of Transportation (Non-Medical):    Physical Activity:     Days of Exercise per Week:     Minutes of Exercise per Session:    Stress:     Feeling of Stress :    Social Connections:     Frequency of Communication with Friends and Family:     Frequency of Social Gatherings with Friends and Family:     Attends Taoism Services:     Active Member of Clubs or Organizations:     Attends Club or Organization Meetings:     Marital Status:    Intimate Partner Violence:     Fear of Current or Ex-Partner:     Emotionally Abused:     Physically Abused:     Sexually Abused:       Family History   Problem Relation Age of Onset    Breast cancer Paternal Grandmother     Ovarian cancer Paternal Aunt     Prostate cancer Paternal Uncle      Past Surgical History:   Procedure Laterality Date    SINUS SURGERY  2000       Current Outpatient Medications:     ascorbic acid (VITAMIN C) 500 mg tablet, Take by mouth, Disp: , Rfl:     ASPIRIN 81 PO, Take 81 mg by mouth daily , Disp: , Rfl:     atorvastatin (LIPITOR) 20 mg tablet, Take 20 mg by mouth daily, Disp: , Rfl:     carvedilol (COREG) 25 mg tablet, Take 25 mg by mouth 2 (two) times a day with meals, Disp: , Rfl:     Coenzyme Q10 10 MG capsule, Take 600 mg by mouth daily, Disp: , Rfl:     fexofenadine (ALLEGRA) 180 MG tablet, Take 1 tablet (180 mg total) by mouth daily, Disp: 30 tablet, Rfl: 3    fluticasone (FLONASE) 50 mcg/act nasal spray, 1 spray into each nostril daily, Disp: 1 Bottle, Rfl: 3    folic acid (FOLVITE) 955 MCG tablet, Take 400 mcg by mouth daily, Disp: , Rfl:     lisinopril (ZESTRIL) 5 mg tablet, Take 10 mg by mouth 2 (two) times a day , Disp: , Rfl:     Omega-3 Fatty Acids (FISH OIL OMEGA-3) 1000 MG CAPS, Take 2 g by mouth daily, Disp: , Rfl:     predniSONE 10 mg tablet, 2 tabs b i d  day 1, 1 tab t i d  day 2, 1 tab b i d  day 3, 1 tab day 4, Disp: 10 tablet, Rfl: 0    Prenatal Vit-Fe Fumarate-FA (PRENATAL VITAMIN) 27-0 8 MG TABS, Take 1 tablet by mouth daily, Disp: , Rfl:     labetalol (NORMODYNE) 100 mg tablet, Take 100 mg by mouth 2 (two) times a day (Patient not taking: Reported on 8/17/2021), Disp: , Rfl:     levothyroxine 88 mcg tablet, Take 88 mcg by mouth daily (Patient not taking: Reported on 8/17/2021), Disp: , Rfl:     norethindrone (MICRONOR) 0 35 MG tablet, Take 0 35 mg by mouth daily, Disp: , Rfl:   Allergies   Allergen Reactions    Aspirin Anaphylaxis and Hives     Upset stomach  Pt allergic to full strength aspirin; able to take baby aspirin without problems      Other Anaphylaxis     Allergic to cat dander--Swollen eyes, runny nose, difficulty breathing    Pollen Extract      Nasal congestion, swollen eyes    Molds & Smuts      Nasal congestion, swollen eyes         Labs:  No visits with results within 2 Month(s) from this visit  Latest known visit with results is:   Appointment on 06/28/2020   Component Date Value    Glucose, GTT - Fasting 06/28/2020 94     Glucose, 2 Hour 75 gm Gl* 06/28/2020 96     Glucose 06/28/2020 161*        Imaging: No results found  ECG:   Normal sinus rhythm, left bundle-branch block    Review of Systems:  Review of Systems   Respiratory: Negative for choking, chest tightness and shortness of breath  Cardiovascular: Negative for chest pain, palpitations and leg swelling  All other systems reviewed and are negative          Vitals:    08/17/21 1122 08/17/21 1130 08/17/21 1147   BP:   118/80   Pulse:  66    Weight: 107 kg (235 lb)     Height: 5' 10" (1 778 m)       Vitals:    08/17/21 1122   Weight: 107 kg (235 lb)     Height: 5' 10" (177 8 cm)     Physical Exam:  General appearance:  Appears stated age, alert, well appearing and in no distress  HEENT:  PERRLA, EOMI, no scleral icterus, no conjunctival pallor  NECK:  Supple, No elevated JVP, no thyromegaly, no carotid bruits  HEART:  Regular rate and rhythm, normal S1/S2, no S3/S4, no murmur or rub  LUNGS:  Clear to auscultation bilaterally  ABDOMEN:  Soft, non-tender, positive bowel sounds, no rebound or guarding, no organomegaly   EXTREMITIES:  No edema  VASCULAR:  Normal pedal pulses   SKIN: No lesions or rashes on exposed skin  NEURO:  CN II-XII intact, no focal deficits

## 2021-10-04 ENCOUNTER — IMMUNIZATIONS (OUTPATIENT)
Dept: FAMILY MEDICINE CLINIC | Facility: CLINIC | Age: 46
End: 2021-10-04
Payer: COMMERCIAL

## 2021-10-04 DIAGNOSIS — Z23 NEED FOR INFLUENZA VACCINATION: Primary | ICD-10-CM

## 2021-10-04 PROCEDURE — 90471 IMMUNIZATION ADMIN: CPT | Performed by: FAMILY MEDICINE

## 2021-10-04 PROCEDURE — 90682 RIV4 VACC RECOMBINANT DNA IM: CPT | Performed by: FAMILY MEDICINE

## 2022-02-07 ENCOUNTER — TELEPHONE (OUTPATIENT)
Dept: FAMILY MEDICINE CLINIC | Facility: CLINIC | Age: 47
End: 2022-02-07

## 2022-02-07 DIAGNOSIS — J01.01 ACUTE RECURRENT MAXILLARY SINUSITIS: Primary | ICD-10-CM

## 2022-02-07 RX ORDER — AMOXICILLIN 500 MG/1
500 CAPSULE ORAL EVERY 8 HOURS SCHEDULED
Qty: 30 CAPSULE | Refills: 0 | Status: SHIPPED | OUTPATIENT
Start: 2022-02-07 | End: 2022-02-17

## 2022-02-07 NOTE — TELEPHONE ENCOUNTER
CC: Nasal / Chest congestion, post nasal drip, cough - No fever  COVID rapid test neg (-)   Father has same S/S & was Rx'd steroid & Tessalon pearls    Asking for antibiotic Or would you like to see her?     Allergies: Aspirin, Pollen, Molds & smuts, Cat dander    Pharm: [facility]

## 2022-06-29 ENCOUNTER — APPOINTMENT (OUTPATIENT)
Dept: LAB | Facility: HOSPITAL | Age: 47
End: 2022-06-29
Payer: COMMERCIAL

## 2022-06-29 DIAGNOSIS — I10 ESSENTIAL HYPERTENSION, BENIGN: ICD-10-CM

## 2022-06-29 DIAGNOSIS — I44.7 LEFT BUNDLE-BRANCH BLOCK: ICD-10-CM

## 2022-06-29 DIAGNOSIS — I25.10 ATHEROSCLEROSIS OF NATIVE CORONARY ARTERY OF NATIVE HEART WITHOUT ANGINA PECTORIS: ICD-10-CM

## 2022-06-29 LAB
CHOLEST SERPL-MCNC: 103 MG/DL
EST. AVERAGE GLUCOSE BLD GHB EST-MCNC: 108 MG/DL
HBA1C MFR BLD: 5.4 %
HDLC SERPL-MCNC: 42 MG/DL
LDLC SERPL CALC-MCNC: 40 MG/DL (ref 0–100)
NONHDLC SERPL-MCNC: 61 MG/DL
NT-PROBNP SERPL-MCNC: 17 PG/ML
TRIGL SERPL-MCNC: 103 MG/DL

## 2022-06-29 PROCEDURE — 80061 LIPID PANEL: CPT

## 2022-06-29 PROCEDURE — 83880 ASSAY OF NATRIURETIC PEPTIDE: CPT

## 2022-06-29 PROCEDURE — 83036 HEMOGLOBIN GLYCOSYLATED A1C: CPT

## 2022-06-29 PROCEDURE — 36415 COLL VENOUS BLD VENIPUNCTURE: CPT

## 2022-08-23 ENCOUNTER — APPOINTMENT (OUTPATIENT)
Dept: LAB | Facility: CLINIC | Age: 47
End: 2022-08-23
Payer: COMMERCIAL

## 2022-08-23 DIAGNOSIS — Z13.0 SCREENING FOR IRON DEFICIENCY ANEMIA: ICD-10-CM

## 2022-08-23 DIAGNOSIS — Z11.4 SCREENING FOR HUMAN IMMUNODEFICIENCY VIRUS: ICD-10-CM

## 2022-08-23 DIAGNOSIS — Z11.3 SCREENING EXAMINATION FOR VENEREAL DISEASE: ICD-10-CM

## 2022-08-23 DIAGNOSIS — Z13.29 SCREENING FOR THYROID DISORDER: ICD-10-CM

## 2022-08-23 DIAGNOSIS — Z11.59 SCREENING EXAMINATION FOR POLIOMYELITIS: ICD-10-CM

## 2022-08-23 LAB
ERYTHROCYTE [DISTWIDTH] IN BLOOD BY AUTOMATED COUNT: 12.4 % (ref 11.6–15.1)
HBV SURFACE AG SER QL: NORMAL
HCT VFR BLD AUTO: 45 % (ref 34.8–46.1)
HGB BLD-MCNC: 14.9 G/DL (ref 11.5–15.4)
MCH RBC QN AUTO: 31.5 PG (ref 26.8–34.3)
MCHC RBC AUTO-ENTMCNC: 33.1 G/DL (ref 31.4–37.4)
MCV RBC AUTO: 95 FL (ref 82–98)
PLATELET # BLD AUTO: 260 THOUSANDS/UL (ref 149–390)
PMV BLD AUTO: 10.1 FL (ref 8.9–12.7)
RBC # BLD AUTO: 4.73 MILLION/UL (ref 3.81–5.12)
TSH SERPL DL<=0.05 MIU/L-ACNC: 3.22 UIU/ML (ref 0.45–4.5)
WBC # BLD AUTO: 6.81 THOUSAND/UL (ref 4.31–10.16)

## 2022-08-23 PROCEDURE — 86592 SYPHILIS TEST NON-TREP QUAL: CPT

## 2022-08-23 PROCEDURE — 87340 HEPATITIS B SURFACE AG IA: CPT

## 2022-08-23 PROCEDURE — 86803 HEPATITIS C AB TEST: CPT

## 2022-08-23 PROCEDURE — 85027 COMPLETE CBC AUTOMATED: CPT

## 2022-08-23 PROCEDURE — 84443 ASSAY THYROID STIM HORMONE: CPT

## 2022-08-23 PROCEDURE — 36415 COLL VENOUS BLD VENIPUNCTURE: CPT

## 2022-08-23 PROCEDURE — 87389 HIV-1 AG W/HIV-1&-2 AB AG IA: CPT

## 2022-08-24 LAB
HCV AB S/CO SERPL IA: <0.1 S/CO RATIO (ref 0–0.9)
HIV 1+2 AB+HIV1 P24 AG SERPL QL IA: NORMAL
RPR SER QL: NORMAL
SL AMB INTERPRETATION: NORMAL

## 2022-11-07 ENCOUNTER — APPOINTMENT (OUTPATIENT)
Dept: LAB | Facility: MEDICAL CENTER | Age: 47
End: 2022-11-07

## 2022-11-07 DIAGNOSIS — Z13.29 SCREENING FOR THYROID DISORDER: ICD-10-CM

## 2022-11-07 DIAGNOSIS — N92.6 IRREGULAR MENSTRUAL CYCLE: ICD-10-CM

## 2022-11-07 LAB
B-HCG SERPL-ACNC: <2 MIU/ML
ESTRADIOL SERPL-MCNC: 21 PG/ML
LH SERPL-ACNC: 20.4 MIU/ML
PROGEST SERPL-MCNC: 0.4 NG/ML
TSH SERPL DL<=0.05 MIU/L-ACNC: 1.77 UIU/ML (ref 0.45–4.5)

## 2023-04-05 ENCOUNTER — OFFICE VISIT (OUTPATIENT)
Dept: FAMILY MEDICINE CLINIC | Facility: CLINIC | Age: 48
End: 2023-04-05

## 2023-04-05 ENCOUNTER — TELEPHONE (OUTPATIENT)
Dept: ADMINISTRATIVE | Facility: OTHER | Age: 48
End: 2023-04-05

## 2023-04-05 VITALS
DIASTOLIC BLOOD PRESSURE: 82 MMHG | TEMPERATURE: 97.4 F | HEART RATE: 68 BPM | WEIGHT: 205 LBS | SYSTOLIC BLOOD PRESSURE: 128 MMHG | OXYGEN SATURATION: 97 % | HEIGHT: 70 IN | BODY MASS INDEX: 29.35 KG/M2

## 2023-04-05 DIAGNOSIS — J45.21 MILD INTERMITTENT REACTIVE AIRWAY DISEASE WITH ACUTE EXACERBATION: Primary | ICD-10-CM

## 2023-04-05 DIAGNOSIS — I50.22 CHRONIC HFREF (HEART FAILURE WITH REDUCED EJECTION FRACTION) (HCC): ICD-10-CM

## 2023-04-05 RX ORDER — AMOXICILLIN 500 MG/1
500 CAPSULE ORAL EVERY 8 HOURS SCHEDULED
Qty: 30 CAPSULE | Refills: 0 | Status: SHIPPED | OUTPATIENT
Start: 2023-04-05 | End: 2023-04-15

## 2023-04-05 RX ORDER — ALBUTEROL SULFATE 90 UG/1
2 AEROSOL, METERED RESPIRATORY (INHALATION) EVERY 4 HOURS PRN
Qty: 18 G | Refills: 3 | Status: SHIPPED | OUTPATIENT
Start: 2023-04-05

## 2023-04-05 RX ORDER — INHALER, ASSIST DEVICES
SPACER (EA) MISCELLANEOUS
Qty: 1 EACH | Refills: 3 | Status: SHIPPED | OUTPATIENT
Start: 2023-04-05

## 2023-04-05 RX ORDER — ESTRADIOL 2 MG/1
2 TABLET ORAL 2 TIMES DAILY
COMMUNITY
Start: 2023-01-27

## 2023-04-05 NOTE — ASSESSMENT & PLAN NOTE
Wt Readings from Last 3 Encounters:   04/05/23 93 kg (205 lb)   08/17/21 107 kg (235 lb)   03/29/21 99 8 kg (220 lb)

## 2023-04-05 NOTE — PROGRESS NOTES
BMI Counseling: Body mass index is 29 41 kg/m²  The BMI is above normal  Nutrition recommendations include decreasing portion sizes, encouraging healthy choices of fruits and vegetables, moderation in carbohydrate intake and increasing intake of lean protein  Rationale for BMI follow-up plan is due to patient being overweight or obese  Depression Screening and Follow-up Plan: Patient was screened for depression during today's encounter  They screened negative with a PHQ-2 score of 0  Assessment/Plan:treat with amoxil and ventolin    Problem List Items Addressed This Visit    None  Visit Diagnoses     Mild intermittent reactive airway disease with acute exacerbation    -  Primary           Diagnoses and all orders for this visit:    Mild intermittent reactive airway disease with acute exacerbation    Other orders  -     Cholecalciferol 50 MCG (2000) CAPS; Take 1 capsule by mouth daily  -     estradiol (ESTRACE) 2 MG tablet; Take 2 mg by mouth 2 (two) times a day  -     PROGESTERONE IM; Inject into a muscle 1mL daily        No problem-specific Assessment & Plan notes found for this encounter  Subjective:      Patient ID: Vanesa Castañeda is a 46 y o  female  Tori De La Rosa is here today with a chief complaint of some end expiratory wheezing sinus congestion she does have some seasonal allergies and her turbinate glands looked quite large but they do not have the typical bluish hue of a true allergy but I am inclined to believe that she has a combination of seasonal allergies along with a upper respiratory infection she did a home COVID test which is negative      The following portions of the patient's history were reviewed and updated as appropriate:   She has a past medical history of H/O:  and History of myomectomy  ,  does not have any pertinent problems on file  ,   has a past surgical history that includes Sinus surgery ()  ,  family history includes Breast cancer in her paternal grandmother; COPD in her mother; Heart failure in her mother; Ovarian cancer in her paternal aunt; Prostate cancer in her paternal uncle ,   reports that she has never smoked  She has never used smokeless tobacco  She reports that she does not currently use alcohol  She reports that she does not use drugs  ,  is allergic to aspirin, other, pollen extract, and molds & smuts     Current Outpatient Medications   Medication Sig Dispense Refill   • ascorbic acid (VITAMIN C) 500 mg tablet Take by mouth     • ASPIRIN 81 PO Take 81 mg by mouth daily      • carvedilol (COREG) 25 mg tablet Take 25 mg by mouth 2 (two) times a day with meals     • Coenzyme Q10 10 MG capsule Take 600 mg by mouth daily     • estradiol (ESTRACE) 2 MG tablet Take 2 mg by mouth 2 (two) times a day     • fexofenadine (ALLEGRA) 180 MG tablet Take 1 tablet (180 mg total) by mouth daily 30 tablet 3   • fluticasone (FLONASE) 50 mcg/act nasal spray 1 spray into each nostril daily 1 Bottle 3   • folic acid (FOLVITE) 573 MCG tablet Take 400 mcg by mouth daily     • levothyroxine 88 mcg tablet Take 88 mcg by mouth daily     • lisinopril (ZESTRIL) 5 mg tablet Take 10 mg by mouth 2 (two) times a day      • Omega-3 Fatty Acids (FISH OIL OMEGA-3) 1000 MG CAPS Take 2 g by mouth daily     • Prenatal Vit-Fe Fumarate-FA (PRENATAL VITAMIN) 27-0 8 MG TABS Take 1 tablet by mouth daily     • PROGESTERONE IM Inject into a muscle 1mL daily     • atorvastatin (LIPITOR) 20 mg tablet Take 20 mg by mouth daily (Patient not taking: Reported on 4/5/2023)     • Cholecalciferol 50 MCG (2000 UT) CAPS Take 1 capsule by mouth daily     • labetalol (NORMODYNE) 100 mg tablet Take 100 mg by mouth 2 (two) times a day (Patient not taking: Reported on 8/17/2021)     • norethindrone (MICRONOR) 0 35 MG tablet Take 0 35 mg by mouth daily     • predniSONE 10 mg tablet 2 tabs b i d  day 1, 1 tab t i d  day 2, 1 tab b i d  day 3, 1 tab day 4 (Patient not taking: Reported on 4/5/2023) 10 "tablet 0     No current facility-administered medications for this visit  Review of Systems   Constitutional: Negative for activity change, appetite change, diaphoresis, fatigue and fever  HENT: Positive for postnasal drip, sinus pressure, sneezing and sore throat  Negative for dental problem  Eyes: Negative  Respiratory: Positive for wheezing  Negative for apnea, cough, chest tightness and shortness of breath  Cardiovascular: Negative for chest pain, palpitations and leg swelling  Gastrointestinal: Negative for abdominal distention, abdominal pain, anal bleeding, constipation, diarrhea, nausea and vomiting  Endocrine: Negative for cold intolerance, heat intolerance, polydipsia, polyphagia and polyuria  Genitourinary: Negative for difficulty urinating, dysuria, flank pain, hematuria and urgency  Musculoskeletal: Negative for arthralgias, back pain, gait problem, joint swelling and myalgias  Skin: Negative for color change, rash and wound  Allergic/Immunologic: Negative for environmental allergies, food allergies and immunocompromised state  Neurological: Negative for dizziness, seizures, syncope, speech difficulty, numbness and headaches  Hematological: Negative for adenopathy  Does not bruise/bleed easily  Psychiatric/Behavioral: Negative for agitation, behavioral problems, hallucinations, sleep disturbance and suicidal ideas  Objective:  Vitals:    04/05/23 0804   BP: 128/82   BP Location: Left arm   Patient Position: Sitting   Cuff Size: Large   Pulse: 68   Temp: (!) 97 4 °F (36 3 °C)   TempSrc: Temporal   SpO2: 97%   Weight: 93 kg (205 lb)   Height: 5' 10\" (1 778 m)     Body mass index is 29 41 kg/m²  Physical Exam  Constitutional:       Appearance: She is ill-appearing  HENT:      Right Ear: Tympanic membrane normal       Left Ear: Tympanic membrane normal       Nose: Congestion and rhinorrhea present        Mouth/Throat:      Mouth: Mucous membranes are moist  " Eyes:      Extraocular Movements: Extraocular movements intact  Conjunctiva/sclera: Conjunctivae normal       Pupils: Pupils are equal, round, and reactive to light  Cardiovascular:      Rate and Rhythm: Normal rate and regular rhythm  Pulmonary:      Effort: Pulmonary effort is normal       Breath sounds: Wheezing present  Neurological:      Mental Status: She is alert

## 2023-04-05 NOTE — TELEPHONE ENCOUNTER
Upon review of the In Basket request we were able to locate, review, and update the patient chart as requested for Mammogram and Pap Smear (HPV) aka Cervical Cancer Screening  Any additional questions or concerns should be emailed to the Practice Liaisons via the appropriate education email address, please do not reply via In Basket  Thank you  Arabella Mccarthy         04/05/23 12:42 PM     VB [employer] SmartForm used to document caregap status      Arabella Mccarthy

## 2023-04-05 NOTE — TELEPHONE ENCOUNTER
----- Message from Charo Guzman sent at 4/5/2023  8:21 AM EDT -----  Regarding: Mammo & Cervical cancer screening  04/05/23 8:22 AM    Hello, our patient Mariangel Liang has had Mammogram and Pap Smear (HPV) aka Cervical Cancer Screening completed/performed  Please assist in updating the patient chart by pulling the Care Everywhere (CE) document  The date of service is 11/21/2022 (Mammo) Unsure of Pap date       Thank you,  Talya Johnson   [facility]

## 2023-05-05 ENCOUNTER — TELEPHONE (OUTPATIENT)
Dept: FAMILY MEDICINE CLINIC | Facility: CLINIC | Age: 48
End: 2023-05-05

## 2023-05-05 NOTE — TELEPHONE ENCOUNTER
LOV 4/5/23 Seth has the sinus infection  Asking for a refill of the antibiotic (Amoxil 50 0mg TID x 10 days    Pt is currently 7 weeks pregnant    Pharm:  [facility]

## 2023-05-08 DIAGNOSIS — J06.9 UPPER RESPIRATORY TRACT INFECTION, UNSPECIFIED TYPE: Primary | ICD-10-CM

## 2023-05-08 RX ORDER — AMOXICILLIN 500 MG/1
500 CAPSULE ORAL EVERY 8 HOURS SCHEDULED
Qty: 30 CAPSULE | Refills: 0 | Status: SHIPPED | OUTPATIENT
Start: 2023-05-08 | End: 2023-05-18

## 2023-10-26 ENCOUNTER — RA CDI HCC (OUTPATIENT)
Dept: OTHER | Facility: HOSPITAL | Age: 48
End: 2023-10-26

## 2023-10-26 NOTE — PROGRESS NOTES
[address] [facility] coding opportunities          Chart Reviewed number of suggestions sent to Provider: 1  I11.0     Patients Insurance        Commercial Insurance: [employer]

## 2023-10-30 ENCOUNTER — IMMUNIZATIONS (OUTPATIENT)
Dept: FAMILY MEDICINE CLINIC | Facility: CLINIC | Age: 48
End: 2023-10-30
Payer: COMMERCIAL

## 2023-10-30 DIAGNOSIS — Z23 ENCOUNTER FOR IMMUNIZATION: Primary | ICD-10-CM

## 2023-10-30 PROCEDURE — 90471 IMMUNIZATION ADMIN: CPT | Performed by: FAMILY MEDICINE

## 2023-10-30 PROCEDURE — 90686 IIV4 VACC NO PRSV 0.5 ML IM: CPT | Performed by: FAMILY MEDICINE

## 2023-11-13 ENCOUNTER — TELEPHONE (OUTPATIENT)
Dept: FAMILY MEDICINE CLINIC | Facility: CLINIC | Age: 48
End: 2023-11-13

## 2023-11-13 DIAGNOSIS — F43.21 GRIEF REACTION: Primary | ICD-10-CM

## 2023-11-13 RX ORDER — ESCITALOPRAM OXALATE 10 MG/1
10 TABLET ORAL DAILY
Qty: 30 TABLET | Refills: 5 | Status: SHIPPED | OUTPATIENT
Start: 2023-11-13 | End: 2024-05-11
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
## 2024-01-12 ENCOUNTER — TELEPHONE (OUTPATIENT)
Dept: FAMILY MEDICINE CLINIC | Facility: CLINIC | Age: 49
End: 2024-01-12

## 2024-01-12 DIAGNOSIS — E07.9 THYROID MASS: Primary | ICD-10-CM

## 2024-01-12 NOTE — TELEPHONE ENCOUNTER
After I had the miscarriage, I had to get a work up, a cardiac work up to get released, have another baby and they [facility] did a cardiac MRI back in November and I went on to my chart to read the report and it said I had a mass on my thyroid, 5 centimeters. Now no one from [facility] had mentioned it to me, brought it up to me anything about my thyroid and it's very concerning to me. And so I reached out and my cardiologist is no longer with [facility]. She has left and I certainly will give a letter in the mail, they said. So I don't know where else to go. I'm asymptomatic.     Patient unsure what to do or where to go from here. Please advise.

## 2024-01-17 ENCOUNTER — HOSPITAL ENCOUNTER (OUTPATIENT)
Dept: ULTRASOUND IMAGING | Facility: HOSPITAL | Age: 49
Discharge: HOME | End: 2024-01-17
Attending: FAMILY MEDICINE
Payer: COMMERCIAL

## 2024-01-17 DIAGNOSIS — E07.9 THYROID MASS: ICD-10-CM

## 2024-01-17 PROCEDURE — 76536 US EXAM OF HEAD AND NECK: CPT

## 2024-01-17 NOTE — LETTER
[facility]  [address] [address]      January 26, 2024    MRN: [id number]     Phone: [phone]     Dear Ms. Lance,    You recently had a(n) Ultrasound performed on 1/17/2024 at  [facility] that was requested by Jose L Rios DO. The study was reviewed by a radiologist, which is a physician who specializes in medical imaging. The radiologist issued a report describing his or her findings. In that report there was a finding that the radiologist felt warranted further discussion with your health care provider and that discussion would be beneficial to you.      The results were sent to Jose L Rios DO on 01/23/2024  2:01 PM. We recommend that you contact Jose L Rios DO at [phone] or set up an appointment to discuss the results of the imaging test. If you have already heard from Jose L Rios DO regarding the results of your study, you can disregard this letter.     This letter is not meant to alarm you, but intended to encourage you to follow-up on your results with the provider that sent you for the imaging study. In addition, we have enclosed answers to frequently asked questions by other patients who have also received a letter to review results with their health care provider (see page two).      Thank you for choosing [facility] for your medical imaging needs.                                                                                                                                                        FREQUENTLY ASKED QUESTIONS    Why am I receiving this letter?  [place] State Law requires us to notify patients who have findings on imaging exams that may require more testing or follow-up with a health professional within the next 3 months.        How serious is the finding on the imaging test?  This letter is sent to all patients who may need follow-up or more testing within the next 3  months.  Receiving this letter does not necessarily mean you have a life-threatening imaging finding or disease.  Recommendations in the radiologist’s imaging report are general in nature and it is up to your healthcare provider to say whether those recommendations make sense for your situation.  You are strongly encouraged to talk to your health care provider about the results and ask whether additional steps need to be taken.    Where can I get a copy of the final report for my recent radiology exam?  To get a full copy of the report you can access your records online at [email or web address] or please contact [facility]’s Medical Records Department at [phone] Monday through Friday between 8 am and 6 pm.         What do I need to do now?           Please contact your health care provider who requested the imaging study to discuss what further actions (if any) are needed.  You may have already heard from (your ordering provider) in regard to this test in which case you can disregard this letter.        NOTICE IN ACCORDANCE WITH THE [place] STATE “PATIENT TEST RESULT INFORMATION ACT OF 2018”    You are receiving this notice as a result of a determination by your diagnostic imaging service that further discussions of your test results are warranted and would be beneficial to you.    The complete results of your test or tests have been or will be sent to the health care practitioner that ordered the test or tests. It is recommended that you contact your health care practitioner to discuss your results as soon as possible.

## 2024-01-23 ENCOUNTER — TELEPHONE (OUTPATIENT)
Dept: FAMILY MEDICINE CLINIC | Facility: CLINIC | Age: 49
End: 2024-01-23

## 2024-01-23 DIAGNOSIS — E04.1 THYROID NODULE GREATER THAN OR EQUAL TO 1.5 CM IN DIAMETER INCIDENTALLY NOTED ON IMAGING STUDY: Primary | ICD-10-CM

## 2024-01-23 NOTE — RESULT ENCOUNTER NOTE
Please call the patient regarding her abnormal result.  Patient has several thyroid nodules but 1 thyroid nodule fits the criteria for a fine-needle biopsy.  I would like to set her up with interventional radiology for an ultrasound-guided biopsy into the left side of the thyroid where she has a nodule that should be biopsied if she agrees let me know so we can set up an interventional radiology consult

## 2024-02-06 NOTE — PRE-PROCEDURE INSTRUCTIONS
Call placed to patient to discuss upcoming US thyroid biopsy at [facility]. Allergies reviewed and verified patient currently takes 162mg ASA daily. Pre-procedure instructions discussed with patient. Patient instructed that she may eat normally and take medications as usual before the procedure. Procedure and post procedure expectations and instructions reviewed with the patient. Patient verbalizes understanding and denies any questions at this time.

## 2024-02-08 ENCOUNTER — TELEPHONE (OUTPATIENT)
Dept: FAMILY MEDICINE CLINIC | Facility: CLINIC | Age: 49
End: 2024-02-08

## 2024-02-08 DIAGNOSIS — Z12.31 ENCOUNTER FOR SCREENING MAMMOGRAM FOR MALIGNANT NEOPLASM OF BREAST: Primary | ICD-10-CM

## 2024-02-08 NOTE — TELEPHONE ENCOUNTER
Pt called stating that for about the last wk or so she has noticed numbness and tingling in b/l hands that is on and off.     - it is more in the morning  -she notices at work if she is holding the same position typing to long her hands get cramped    And tingling   - if hs holds her prayer book to long in the same position in the morning she notices the cramping and numbness.     Please advise pt is concerned     Thank you

## 2024-02-19 ENCOUNTER — HOSPITAL ENCOUNTER (OUTPATIENT)
Dept: ULTRASOUND IMAGING | Facility: HOSPITAL | Age: 49
Discharge: HOME | End: 2024-02-19
Attending: FAMILY MEDICINE
Payer: COMMERCIAL

## 2024-02-19 DIAGNOSIS — E04.1 NONTOXIC SINGLE THYROID NODULE: ICD-10-CM

## 2024-02-19 PROCEDURE — 88173 CYTOPATH EVAL FNA REPORT: CPT | Performed by: PATHOLOGY

## 2024-02-19 RX ORDER — LIDOCAINE HYDROCHLORIDE 10 MG/ML
5 INJECTION, SOLUTION EPIDURAL; INFILTRATION; INTRACAUDAL; PERINEURAL
Status: COMPLETED | OUTPATIENT
Start: 2024-02-19 | End: 2024-02-19

## 2024-02-19 RX ADMIN — LIDOCAINE HYDROCHLORIDE 5 ML: 10 INJECTION, SOLUTION EPIDURAL; INFILTRATION; INTRACAUDAL; PERINEURAL at 10:35

## 2024-02-21 PROCEDURE — 88173 CYTOPATH EVAL FNA REPORT: CPT | Performed by: PATHOLOGY

## 2024-04-08 DIAGNOSIS — Z12.11 SCREENING FOR COLON CANCER: Primary | ICD-10-CM

## 2024-04-12 ENCOUNTER — PREP FOR PROCEDURE (OUTPATIENT)
Age: 49
End: 2024-04-12

## 2024-04-12 ENCOUNTER — TELEPHONE (OUTPATIENT)
Age: 49
End: 2024-04-12

## 2024-04-12 DIAGNOSIS — Z12.11 SCREENING FOR COLON CANCER: Primary | ICD-10-CM

## 2024-04-12 NOTE — TELEPHONE ENCOUNTER
Scheduled date of colonoscopy (as of today):7/9/2024  Physician performing colonoscopy:Dr Jaiyeola  Location of colonoscopy:[facility]  Bowel prep reviewed with patient:Miralax/Dulcolax  Instructions reviewed with patient by:sent via appweevr  Clearances: N/A

## 2024-04-12 NOTE — TELEPHONE ENCOUNTER
04/12/24  Screened by: Jayne Domingo    Referring Provider Dr Rios    Pre- Screening:     There is no height or weight on file to calculate BMI. 205lbs 29.4  Has patient been referred for a routine screening Colonoscopy? yes  Is the patient between 45-75 years old? yes      Previous Colonoscopy no   If yes:    Date:     Facility:     Reason:     Does the patient want to see a Gastroenterologist prior to their procedure OR are they having any GI symptoms? no    Has the patient been hospitalized or had abdominal surgery in the past 6 months? no    Does the patient use supplemental oxygen? no    Does the patient take Coumadin, Lovenox, Plavix, Elliquis, Xarelto, or other blood thinning medication? no    Has the patient had a stroke, cardiac event, or stent placed in the past year? no      If patient is between 45yrs - 49yrs, please advise patient that we will have to confirm benefits & coverage with their insurance company for a routine screening colonoscopy.     Not applicable as gestational age is greater than or equal to 34 weeks.

## 2024-04-16 ENCOUNTER — HOSPITAL ENCOUNTER (OUTPATIENT)
Dept: MAMMOGRAPHY | Facility: HOSPITAL | Age: 49
Discharge: HOME | End: 2024-04-16
Attending: FAMILY MEDICINE
Payer: COMMERCIAL

## 2024-04-16 VITALS — HEIGHT: 70 IN | WEIGHT: 205 LBS | BODY MASS INDEX: 29.35 KG/M2

## 2024-04-16 DIAGNOSIS — Z12.31 ENCOUNTER FOR SCREENING MAMMOGRAM FOR MALIGNANT NEOPLASM OF BREAST: ICD-10-CM

## 2024-04-16 PROCEDURE — 77067 SCR MAMMO BI INCL CAD: CPT

## 2024-04-16 PROCEDURE — 77063 BREAST TOMOSYNTHESIS BI: CPT

## 2024-04-24 DIAGNOSIS — F43.21 GRIEF REACTION: ICD-10-CM

## 2024-04-24 RX ORDER — ESCITALOPRAM OXALATE 10 MG/1
10 TABLET ORAL DAILY
Qty: 30 TABLET | Refills: 0 | Status: SHIPPED | OUTPATIENT
Start: 2024-04-24

## 2024-05-26 DIAGNOSIS — F43.21 GRIEF REACTION: ICD-10-CM

## 2024-05-28 RX ORDER — ESCITALOPRAM OXALATE 10 MG/1
10 TABLET ORAL DAILY
Qty: 30 TABLET | Refills: 0 | Status: SHIPPED | OUTPATIENT
Start: 2024-05-28

## 2024-06-20 ENCOUNTER — OFFICE VISIT (OUTPATIENT)
Dept: FAMILY MEDICINE CLINIC | Facility: CLINIC | Age: 49
End: 2024-06-20
Payer: COMMERCIAL

## 2024-06-20 VITALS
TEMPERATURE: 97.3 F | BODY MASS INDEX: 31.5 KG/M2 | DIASTOLIC BLOOD PRESSURE: 82 MMHG | SYSTOLIC BLOOD PRESSURE: 128 MMHG | HEIGHT: 70 IN | WEIGHT: 220 LBS | OXYGEN SATURATION: 95 % | HEART RATE: 62 BPM

## 2024-06-20 DIAGNOSIS — I50.22 CHRONIC HFREF (HEART FAILURE WITH REDUCED EJECTION FRACTION) (HCC): ICD-10-CM

## 2024-06-20 DIAGNOSIS — F43.21 GRIEF REACTION: ICD-10-CM

## 2024-06-20 DIAGNOSIS — N18.5 CHRONIC RENAL DISEASE, STAGE V (HCC): Primary | ICD-10-CM

## 2024-06-20 PROCEDURE — 99396 PREV VISIT EST AGE 40-64: CPT | Performed by: FAMILY MEDICINE

## 2024-06-20 RX ORDER — ASCORBIC ACID 125 MG
TABLET,CHEWABLE ORAL DAILY
COMMUNITY

## 2024-06-20 RX ORDER — CYANOCOBALAMIN (VITAMIN B-12) 1000 MCG
TABLET, EXTENDED RELEASE ORAL DAILY
COMMUNITY

## 2024-06-20 RX ORDER — PRASTERONE (DHEA) 50 MG
CAPSULE ORAL DAILY
COMMUNITY

## 2024-06-20 RX ORDER — VITAMIN E (DL,TOCOPHERYL ACET) 400 UNIT
TABLET,CHEWABLE ORAL DAILY
COMMUNITY

## 2024-06-20 RX ORDER — ATORVASTATIN CALCIUM 40 MG/1
40 TABLET, FILM COATED ORAL EVERY MORNING
COMMUNITY
Start: 2024-04-04

## 2024-06-20 RX ORDER — HYDRALAZINE HYDROCHLORIDE 10 MG/1
10 TABLET, FILM COATED ORAL 3 TIMES DAILY
COMMUNITY
Start: 2024-06-15

## 2024-06-20 RX ORDER — ESCITALOPRAM OXALATE 10 MG/1
10 TABLET ORAL DAILY
Qty: 30 TABLET | Refills: 3 | Status: SHIPPED | OUTPATIENT
Start: 2024-06-20

## 2024-06-20 NOTE — PROGRESS NOTES
"Ambulatory Visit  Name: Berna Lance      : 1975      MRN: [id number]  Encounter Provider: Jose L Rios DO  Encounter Date: 2024   Encounter department: [facility]    Assessment & Plan   1. Grief reaction       History of Present Illness     Patient here for a follow-up visit and annual physical and will continue escitalopram        Review of Systems   Constitutional:  Negative for activity change, appetite change, diaphoresis, fatigue and fever.   HENT: Negative.  Negative for dental problem and hearing loss.    Eyes: Negative.  Negative for visual disturbance.   Respiratory:  Negative for apnea, cough, chest tightness, shortness of breath and wheezing.    Cardiovascular:  Negative for chest pain, palpitations and leg swelling.   Gastrointestinal:  Negative for abdominal distention, abdominal pain, anal bleeding, constipation, diarrhea, nausea and vomiting.   Endocrine: Negative for cold intolerance, heat intolerance, polydipsia, polyphagia and polyuria.   Genitourinary:  Negative for difficulty urinating, dysuria, flank pain, hematuria and urgency.   Musculoskeletal:  Negative for arthralgias, back pain, gait problem, joint swelling and myalgias.   Skin:  Negative for color change, rash and wound.   Allergic/Immunologic: Negative for environmental allergies, food allergies and immunocompromised state.   Neurological:  Negative for dizziness, seizures, syncope, speech difficulty, numbness and headaches.   Hematological:  Negative for adenopathy. Does not bruise/bleed easily.   Psychiatric/Behavioral:  Negative for agitation, behavioral problems, hallucinations, sleep disturbance and suicidal ideas.        Objective     /82 (BP Location: Left arm, Patient Position: Sitting, Cuff Size: Large)   Pulse 62   Temp (!) 97.3 °F (36.3 °C) (Temporal)   Ht 5' 10\" (1.778 m)   Wt 99.8 kg (220 lb)   SpO2 95%   BMI 31.57 kg/m²     Physical Exam  Constitutional:       Appearance: " She is well-developed.   HENT:      Head: Normocephalic and atraumatic.      Right Ear: External ear normal.      Left Ear: External ear normal.      Nose: Nose normal.   Eyes:      Conjunctiva/sclera: Conjunctivae normal.      Pupils: Pupils are equal, round, and reactive to light.   Cardiovascular:      Rate and Rhythm: Normal rate and regular rhythm.      Heart sounds: Normal heart sounds. No murmur heard.     No friction rub.   Pulmonary:      Effort: Pulmonary effort is normal. No respiratory distress.      Breath sounds: Normal breath sounds. No wheezing or rales.   Chest:      Chest wall: No tenderness.   Abdominal:      General: Bowel sounds are normal.      Palpations: Abdomen is soft.   Musculoskeletal:         General: Normal range of motion.      Cervical back: Normal range of motion and neck supple.   Skin:     General: Skin is warm and dry.      Capillary Refill: Capillary refill takes 2 to 3 seconds.   Neurological:      Mental Status: She is alert and oriented to person, place, and time.   Psychiatric:         Behavior: Behavior normal.         Thought Content: Thought content normal.         Judgment: Judgment normal.       Administrative Statements

## 2024-07-01 DIAGNOSIS — J45.21 MILD INTERMITTENT REACTIVE AIRWAY DISEASE WITH ACUTE EXACERBATION: ICD-10-CM

## 2024-07-01 RX ORDER — ALBUTEROL SULFATE 90 UG/1
2 AEROSOL, METERED RESPIRATORY (INHALATION) EVERY 4 HOURS PRN
Qty: 18 G | Refills: 5 | Status: SHIPPED | OUTPATIENT
Start: 2024-07-01

## 2024-07-01 NOTE — TELEPHONE ENCOUNTER
Reason for call:   [x] Refill   [] Prior Auth  [] Other:     Office:   [x] PCP/Provider - KEVIN GOOD  [] Specialty/Provider -     Medication: albuterol (Ventolin HFA) 90 mcg/act inhaler     Dose/Frequency: Inhale 2 puffs every 4 (four) hours as needed for wheezing     Quantity: 18G    Pharmacy: [facility] [address] [place] [address] - [facility] [address]     Does the patient have enough for 3 days?   [x] Yes   [] No - Send as HP to POD    
generalized

## 2024-07-05 ENCOUNTER — TELEPHONE (OUTPATIENT)
Age: 49
End: 2024-07-05

## 2024-07-05 DIAGNOSIS — J06.9 UPPER RESPIRATORY TRACT INFECTION, UNSPECIFIED TYPE: Primary | ICD-10-CM

## 2024-07-05 RX ORDER — CEFUROXIME AXETIL 500 MG/1
500 TABLET ORAL EVERY 12 HOURS SCHEDULED
Qty: 14 TABLET | Refills: 0 | Status: SHIPPED | OUTPATIENT
Start: 2024-07-05 | End: 2024-07-12

## 2024-07-05 NOTE — TELEPHONE ENCOUNTER
Pt c/o some brown discharge right nipple every now and then.  Does not c/o pain and recently had a normal mammogram.  Please advise    Also, states she recently seen you and still has the upper respiratory infection. Asking for antibiotic

## 2024-09-03 ENCOUNTER — ANESTHESIA EVENT (OUTPATIENT)
Dept: PERIOP | Facility: HOSPITAL | Age: 49
End: 2024-09-03

## 2024-09-03 ENCOUNTER — HOSPITAL ENCOUNTER (OUTPATIENT)
Dept: PERIOP | Facility: HOSPITAL | Age: 49
Setting detail: OUTPATIENT SURGERY
Discharge: HOME | End: 2024-09-03
Attending: INTERNAL MEDICINE
Payer: COMMERCIAL

## 2024-09-03 ENCOUNTER — ANESTHESIA (OUTPATIENT)
Dept: PERIOP | Facility: HOSPITAL | Age: 49
End: 2024-09-03

## 2024-09-03 VITALS
TEMPERATURE: 97.5 F | WEIGHT: 220 LBS | RESPIRATION RATE: 18 BRPM | HEART RATE: 56 BPM | SYSTOLIC BLOOD PRESSURE: 124 MMHG | HEIGHT: 70 IN | OXYGEN SATURATION: 94 % | DIASTOLIC BLOOD PRESSURE: 72 MMHG | BODY MASS INDEX: 31.5 KG/M2

## 2024-09-03 DIAGNOSIS — Z12.11 SCREENING FOR COLON CANCER: ICD-10-CM

## 2024-09-03 LAB
EXT PREGNANCY TEST URINE: NEGATIVE
EXT. CONTROL: NORMAL

## 2024-09-03 PROCEDURE — 88305 TISSUE EXAM BY PATHOLOGIST: CPT | Performed by: PATHOLOGY

## 2024-09-03 PROCEDURE — 81025 URINE PREGNANCY TEST: CPT | Performed by: ANESTHESIOLOGY

## 2024-09-03 PROCEDURE — 45385 COLONOSCOPY W/LESION REMOVAL: CPT | Performed by: INTERNAL MEDICINE

## 2024-09-03 RX ORDER — LIDOCAINE HYDROCHLORIDE 10 MG/ML
INJECTION, SOLUTION EPIDURAL; INFILTRATION; INTRACAUDAL; PERINEURAL AS NEEDED
Status: DISCONTINUED | OUTPATIENT
Start: 2024-09-03 | End: 2024-09-03

## 2024-09-03 RX ORDER — SODIUM CHLORIDE, SODIUM LACTATE, POTASSIUM CHLORIDE, CALCIUM CHLORIDE 600; 310; 30; 20 MG/100ML; MG/100ML; MG/100ML; MG/100ML
75 INJECTION, SOLUTION INTRAVENOUS CONTINUOUS
Status: DISCONTINUED | OUTPATIENT
Start: 2024-09-03 | End: 2024-09-07 | Stop reason: HOSPADM

## 2024-09-03 RX ORDER — PROPOFOL 10 MG/ML
INJECTION, EMULSION INTRAVENOUS AS NEEDED
Status: DISCONTINUED | OUTPATIENT
Start: 2024-09-03 | End: 2024-09-03

## 2024-09-03 RX ADMIN — PROPOFOL 20 MG: 10 INJECTION, EMULSION INTRAVENOUS at 10:37

## 2024-09-03 RX ADMIN — SODIUM CHLORIDE, SODIUM LACTATE, POTASSIUM CHLORIDE, AND CALCIUM CHLORIDE 75 ML/HR: .6; .31; .03; .02 INJECTION, SOLUTION INTRAVENOUS at 09:39

## 2024-09-03 RX ADMIN — PROPOFOL 140 MG: 10 INJECTION, EMULSION INTRAVENOUS at 10:32

## 2024-09-03 RX ADMIN — PROPOFOL 20 MG: 10 INJECTION, EMULSION INTRAVENOUS at 10:42

## 2024-09-03 RX ADMIN — PROPOFOL 20 MG: 10 INJECTION, EMULSION INTRAVENOUS at 10:53

## 2024-09-03 RX ADMIN — PROPOFOL 20 MG: 10 INJECTION, EMULSION INTRAVENOUS at 10:44

## 2024-09-03 RX ADMIN — PROPOFOL 30 MG: 10 INJECTION, EMULSION INTRAVENOUS at 10:47

## 2024-09-03 RX ADMIN — PROPOFOL 20 MG: 10 INJECTION, EMULSION INTRAVENOUS at 10:35

## 2024-09-03 RX ADMIN — PROPOFOL 30 MG: 10 INJECTION, EMULSION INTRAVENOUS at 10:49

## 2024-09-03 RX ADMIN — PROPOFOL 20 MG: 10 INJECTION, EMULSION INTRAVENOUS at 10:39

## 2024-09-03 RX ADMIN — LIDOCAINE HYDROCHLORIDE 50 MG: 10 INJECTION, SOLUTION EPIDURAL; INFILTRATION; INTRACAUDAL; PERINEURAL at 10:32

## 2024-09-03 RX ADMIN — PROPOFOL 30 MG: 10 INJECTION, EMULSION INTRAVENOUS at 10:51

## 2024-09-03 RX ADMIN — PROPOFOL 20 MG: 10 INJECTION, EMULSION INTRAVENOUS at 10:34

## 2024-09-03 NOTE — H&P
H&P EXAM - Outpatient Endoscopy   Berna Lance 49 y.o. female MRN: [id number]    [facility]   Encounter: [id number]        History and Physical -  [facility]  Berna Lance 49 y.o. female MRN: [id number]                  HPI: Berna Lance is a 49 y.o. year old female who presents for colon cancer screening      REVIEW OF SYSTEMS: Per the HPI, and otherwise unremarkable.    Historical Information   Past Medical History:   Diagnosis Date    Asthma     H/O:      History of myomectomy      Past Surgical History:   Procedure Laterality Date    SINUS SURGERY      US GUIDED THYROID BIOPSY  2024     Social History   Social History     Substance and Sexual Activity   Alcohol Use Not Currently     Social History     Substance and Sexual Activity   Drug Use Never     Social History     Tobacco Use   Smoking Status Never   Smokeless Tobacco Never   Tobacco Comments    Former smoker per [employer]     Family History   Problem Relation Age of Onset    Heart failure Mother     COPD Mother     No Known Problems Father     Endometriosis Sister     No Known Problems Daughter     No Known Problems Maternal Grandmother     No Known Problems Maternal Grandfather     Breast cancer Paternal Grandmother 84    Lung disease Paternal Grandfather         anthroscilicosis    Ovarian cancer Paternal Aunt     No Known Problems Paternal Aunt     No Known Problems Paternal Aunt     Prostate cancer Paternal Uncle     No Known Problems Maternal Aunt     No Known Problems Maternal Aunt     No Known Problems Maternal Aunt        Meds/Allergies     Not in a hospital admission.    Allergies   Allergen Reactions    Aspirin Anaphylaxis and Hives     Upset stomach  Pt allergic to full strength aspirin; able to take baby aspirin without problems      Other Anaphylaxis     Allergic to cat dander--Swollen eyes, runny nose, difficulty breathing    Pollen  "Extract      Nasal congestion, swollen eyes    Molds & Smuts      Nasal congestion, swollen eyes       Objective     /75   Pulse 56   Temp 97.5 °F (36.4 °C) (Tympanic)   Resp 18   Ht 5' 10\" (1.778 m)   Wt 99.8 kg (220 lb)   LMP 06/10/2024 (Approximate) Comment: Pregnancy test negative 09/03/2024  SpO2 94%   BMI 31.57 kg/m²       PHYSICAL EXAM    Gen: NAD  CV: RRR  CHEST: Clear  ABD: soft, NT/ND  EXT: no edema      ASSESSMENT/PLAN:  This is a 49 y.o. year old female here for colonoscopy, and she is stable and optimized for her procedure.            "

## 2024-09-03 NOTE — ANESTHESIA POSTPROCEDURE EVALUATION
Post-Op Assessment Note    CV Status:  Stable    Pain management: satisfactory to patient       Mental Status:  Alert and awake   Hydration Status:  Stable   PONV Controlled:  None   Airway Patency:  Patent     Post Op Vitals Reviewed: Yes    No anethesia notable event occurred.    Staff: CRNA               BP   137/65   Temp   98.6   Pulse  62   Resp   14   SpO2   99

## 2024-09-03 NOTE — ANESTHESIA PREPROCEDURE EVALUATION
Procedure:  COLONOSCOPY    Relevant Problems   CARDIO   (+) Coronary artery disease involving native coronary artery of native heart without angina pectoris   (+) Essential hypertension   (+) Left bundle branch block      /RENAL   (+) Chronic renal disease, stage V (HCC)      PULMONARY   (-) Smoking   (-) URI (upper respiratory infection)      Obstetrics/Gynecology   (+) Peripartum cardiomyopathy (Very Extensive Exercise program w/o incident.)      Cardiovascular/Peripheral Vascular   (+) Chronic HFrEF (heart failure with reduced ejection fraction) ([facility])   2/22  Left Ventricle   Left ventricle is normal in size. There is mild septal asymmetric hypertrophy. Systolic function is low normal with an ejection fraction of 50-55%. Wall motion is within normal limits. There is normal diastolic function. There is abnormal septal motion consistent with left bundle branch block.        Physical Exam    Airway    Mallampati score: II  TM Distance: >3 FB  Neck ROM: full     Dental   No notable dental hx     Cardiovascular      Pulmonary   Breath sounds clear to auscultation    Other Findings  post-pubertal.      Anesthesia Plan  ASA Score- 3     Anesthesia Type- IV sedation with anesthesia with ASA Monitors.         Additional Monitors:     Airway Plan:            Plan Factors-Exercise tolerance (METS): >4 METS.    Chart reviewed. EKG reviewed. Imaging results reviewed. Existing labs reviewed. Patient summary reviewed.    Patient is not a current smoker.              Induction- intravenous.    Postoperative Plan-         Informed Consent- Anesthetic plan and risks discussed with patient.  I personally reviewed this patient with the CRNA. Discussed and agreed on the Anesthesia Plan with the CRNA..

## 2024-09-06 PROCEDURE — 88305 TISSUE EXAM BY PATHOLOGIST: CPT | Performed by: PATHOLOGY

## 2024-09-06 NOTE — RESULT ENCOUNTER NOTE
Results relayed via Mychart  Multiple subcentimeter tubular adenomas and sessile serrated adenoma.  Repeat colonoscopy in 3 years

## 2024-10-11 ENCOUNTER — IMMUNIZATIONS (OUTPATIENT)
Dept: FAMILY MEDICINE CLINIC | Facility: CLINIC | Age: 49
End: 2024-10-11
Payer: COMMERCIAL

## 2024-10-11 DIAGNOSIS — Z23 ENCOUNTER FOR IMMUNIZATION: Primary | ICD-10-CM

## 2024-10-11 PROCEDURE — 90471 IMMUNIZATION ADMIN: CPT | Performed by: FAMILY MEDICINE

## 2024-10-11 PROCEDURE — 90673 RIV3 VACCINE NO PRESERV IM: CPT | Performed by: FAMILY MEDICINE

## 2024-10-16 DIAGNOSIS — F43.21 GRIEF REACTION: ICD-10-CM

## 2024-10-16 RX ORDER — ESCITALOPRAM OXALATE 10 MG/1
10 TABLET ORAL DAILY
Qty: 90 TABLET | Refills: 1 | Status: SHIPPED | OUTPATIENT
Start: 2024-10-16

## 2024-11-15 ENCOUNTER — TELEPHONE (OUTPATIENT)
Age: 49
End: 2024-11-15

## 2024-11-15 NOTE — TELEPHONE ENCOUNTER
Pt reports she over extended her ankle when she slid on something at a horse show. Pt is not local at this time, and would like to know what she should do or if PCP should put an xray. Pt states she has some mild swelling, but no bruising or redness. Skin is intact and she can bear weight.Triage nurse advised pt to go to urgent care that local or the ER.  PCP please call pt to further advise and follow up.

## 2025-03-06 ENCOUNTER — OFFICE VISIT (OUTPATIENT)
Dept: FAMILY MEDICINE CLINIC | Facility: CLINIC | Age: 50
End: 2025-03-06
Payer: COMMERCIAL

## 2025-03-06 VITALS
DIASTOLIC BLOOD PRESSURE: 70 MMHG | WEIGHT: 215 LBS | HEART RATE: 64 BPM | HEIGHT: 70 IN | RESPIRATION RATE: 17 BRPM | BODY MASS INDEX: 30.78 KG/M2 | OXYGEN SATURATION: 96 % | SYSTOLIC BLOOD PRESSURE: 128 MMHG | TEMPERATURE: 98.8 F

## 2025-03-06 DIAGNOSIS — L23.9 ALLERGIC DERMATITIS: Primary | ICD-10-CM

## 2025-03-06 DIAGNOSIS — I50.22 CHRONIC HFREF (HEART FAILURE WITH REDUCED EJECTION FRACTION) (HCC): ICD-10-CM

## 2025-03-06 DIAGNOSIS — N18.5 CHRONIC RENAL DISEASE, STAGE V (HCC): ICD-10-CM

## 2025-03-06 PROCEDURE — 99213 OFFICE O/P EST LOW 20 MIN: CPT | Performed by: PHYSICIAN ASSISTANT

## 2025-03-06 RX ORDER — PREDNISONE 10 MG/1
TABLET ORAL
Qty: 20 TABLET | Refills: 0 | Status: SHIPPED | OUTPATIENT
Start: 2025-03-06

## 2025-03-06 RX ORDER — TOPIRAMATE 25 MG/1
25 TABLET, FILM COATED ORAL DAILY
COMMUNITY
Start: 2025-01-02

## 2025-03-06 RX ORDER — NITROGLYCERIN 0.4 MG/1
0.4 TABLET SUBLINGUAL
COMMUNITY
Start: 2024-11-21

## 2025-03-06 NOTE — ASSESSMENT & PLAN NOTE
Wt Readings from Last 3 Encounters:   03/06/25 97.5 kg (215 lb)   09/03/24 99.8 kg (220 lb)   08/19/24 99.8 kg (220 lb)

## 2025-03-06 NOTE — ASSESSMENT & PLAN NOTE
Lab Results   Component Value Date    EGFR >90 06/10/2023    EGFR 106 10/19/2020    EGFR 104 09/17/2020    CREATININE 0.6 06/10/2023    CREATININE 0.65 09/13/2021    CREATININE 0.76 08/09/2021

## 2025-03-06 NOTE — PROGRESS NOTES
Name: Berna Lance      : 1975      MRN: [id number]  Encounter Provider: Maite Duque PA-C  Encounter Date: 3/6/2025   Encounter department: [facility]  :  Assessment & Plan  Allergic dermatitis  Doubtful rash is from topamax, however pt states this is the only change she's made in the past month. There, stop the topamax and take/finish prednisone taper. Notify prescriber regarding the rash and the D/C. Recommend to stop using all topicals on face until skin clears.  Orders:  •  predniSONE 10 mg tablet; Days 1 and 2 take 4 tablets daily, days 3 and 4 take 3 tablets daily, days 5 and 6 and 2 tablets daily, days 7 and 8 take 1 tablet daily.    Chronic HFrEF (heart failure with reduced ejection fraction) ([facility])  Wt Readings from Last 3 Encounters:   25 97.5 kg (215 lb)   24 99.8 kg (220 lb)   24 99.8 kg (220 lb)                  Chronic renal disease, stage V ([facility])  Lab Results   Component Value Date    EGFR >90 06/10/2023    EGFR 106 10/19/2020    EGFR 104 2020    CREATININE 0.6 06/10/2023    CREATININE 0.65 2021    CREATININE 0.76 2021                Depression Screening and Follow-up Plan: Patient was screened for depression during today's encounter. They screened negative with a PHQ-2 score of 0.        History of Present Illness   Berna is here today due to a red, burning rash on her face that's developed over the past few days. Started taking topamax 1 month ago, otherwise denies other medication changes.  She states that she has used multiple topicals on her face including retinal but this has not changed, she's used these for years. Currently, face is very dry and red. She does take oral vit B12 but states has taken this x years.      Review of Systems   Constitutional:  Negative for chills and fever.   HENT:  Negative for ear pain and sore throat.    Eyes:  Negative for pain and visual disturbance.   Respiratory:  Negative for cough and  "shortness of breath.    Cardiovascular:  Negative for chest pain and palpitations.   Gastrointestinal:  Negative for abdominal pain and vomiting.   Genitourinary:  Negative for dysuria and hematuria.   Musculoskeletal:  Negative for arthralgias and back pain.   Skin:  Positive for rash. Negative for color change.   Neurological:  Negative for seizures and syncope.   All other systems reviewed and are negative.      Objective   /70 (BP Location: Left arm, Patient Position: Sitting, Cuff Size: Standard)   Pulse 64   Temp 98.8 °F (37.1 °C) (Temporal)   Resp 17   Ht 5' 10\" (1.778 m)   Wt 97.5 kg (215 lb)   SpO2 96%   BMI 30.85 kg/m²      Physical Exam  Vitals reviewed.   Constitutional:       General: She is not in acute distress.     Appearance: She is well-developed. She is not diaphoretic.   HENT:      Head: Normocephalic and atraumatic.      Right Ear: Hearing, tympanic membrane, ear canal and external ear normal.      Left Ear: Hearing, tympanic membrane, ear canal and external ear normal.      Nose: Nose normal.      Mouth/Throat:      Mouth: Mucous membranes are moist.      Pharynx: Oropharynx is clear. Uvula midline. No oropharyngeal exudate.   Eyes:      General: No scleral icterus.        Right eye: No discharge.         Left eye: No discharge.      Conjunctiva/sclera: Conjunctivae normal.   Neck:      Thyroid: No thyromegaly.      Vascular: No carotid bruit.   Cardiovascular:      Rate and Rhythm: Normal rate and regular rhythm.      Heart sounds: Normal heart sounds. No murmur heard.  Pulmonary:      Effort: Pulmonary effort is normal. No respiratory distress.      Breath sounds: Normal breath sounds. No wheezing.   Abdominal:      General: Bowel sounds are normal. There is no distension.      Palpations: Abdomen is soft. There is no mass.      Tenderness: There is no abdominal tenderness. There is no guarding or rebound.   Musculoskeletal:         General: No tenderness. Normal range of motion. "      Cervical back: Neck supple.   Lymphadenopathy:      Cervical: No cervical adenopathy.   Skin:     General: Skin is warm and dry.      Findings: Erythema and rash present.      Comments: Face appears to have reddened dry mask-like appearance   Neurological:      Mental Status: She is alert and oriented to person, place, and time.   Psychiatric:         Behavior: Behavior normal.         Thought Content: Thought content normal.         Judgment: Judgment normal.

## 2025-04-08 ENCOUNTER — TELEPHONE (OUTPATIENT)
Age: 50
End: 2025-04-08

## 2025-04-08 NOTE — TELEPHONE ENCOUNTER
Patient called in asking for orders to be placed for her to get a mammogram at [facility].     Please advise and call patient

## 2025-04-09 DIAGNOSIS — F43.20 GRIEF REACTION: ICD-10-CM

## 2025-04-09 DIAGNOSIS — Z12.31 ENCOUNTER FOR SCREENING MAMMOGRAM FOR BREAST CANCER: Primary | ICD-10-CM

## 2025-04-10 RX ORDER — ESCITALOPRAM OXALATE 10 MG/1
10 TABLET ORAL DAILY
Qty: 90 TABLET | Refills: 1 | Status: SHIPPED | OUTPATIENT
Start: 2025-04-10

## 2025-04-29 ENCOUNTER — HOSPITAL ENCOUNTER (OUTPATIENT)
Dept: MAMMOGRAPHY | Facility: HOSPITAL | Age: 50
Discharge: HOME | End: 2025-04-29
Attending: FAMILY MEDICINE
Payer: COMMERCIAL

## 2025-04-29 VITALS — WEIGHT: 215 LBS | BODY MASS INDEX: 30.78 KG/M2 | HEIGHT: 70 IN

## 2025-04-29 DIAGNOSIS — Z12.31 ENCOUNTER FOR SCREENING MAMMOGRAM FOR BREAST CANCER: ICD-10-CM

## 2025-04-29 PROCEDURE — 77067 SCR MAMMO BI INCL CAD: CPT

## 2025-04-29 PROCEDURE — 77063 BREAST TOMOSYNTHESIS BI: CPT

## 2025-05-02 ENCOUNTER — RESULTS FOLLOW-UP (OUTPATIENT)
Dept: FAMILY MEDICINE CLINIC | Facility: CLINIC | Age: 50
End: 2025-05-02

## 2025-06-03 ENCOUNTER — NURSE TRIAGE (OUTPATIENT)
Age: 50
End: 2025-06-03

## 2025-06-03 DIAGNOSIS — J01.01 ACUTE RECURRENT MAXILLARY SINUSITIS: Primary | ICD-10-CM

## 2025-06-03 RX ORDER — AMOXICILLIN 500 MG/1
500 CAPSULE ORAL EVERY 8 HOURS SCHEDULED
Qty: 30 CAPSULE | Refills: 0 | Status: SHIPPED | OUTPATIENT
Start: 2025-06-03 | End: 2025-06-13

## 2025-06-03 NOTE — TELEPHONE ENCOUNTER
"REASON FOR CONVERSATION: Sinusitis    SYMPTOMS: sinus pressure and congestion and fever. Fever is 100.0F. Patient also having yellow nasal discharge. Symptoms started 4 days ago.    OTHER HEALTH INFORMATION: Patient states she always gets this because of allergies. Having no trouble breathing.     PROTOCOL DISPOSITION: See Today in Office    CARE ADVICE PROVIDED: Has been taking tylenol and motrin for fever. Not available for appointment today she is at work asking for recommendations of PCP. She would like antibiotic called to [facility].    PRACTICE FOLLOW-UP: Please review and call patient with further recommendations [phone].             Reason for Disposition   Sinus pain (not just congestion) and fever    Answer Assessment - Initial Assessment Questions  1. LOCATION: \"Where does it hurt?\"       Sinus pain and pressure  2. ONSET: \"When did the sinus pain start?\"  (e.g., hours, days)       Started 4 days ago  3. SEVERITY: \"How bad is the pain?\"   (Scale 0-10; or none, mild, moderate or severe)      Pain is mild  4. RECURRENT SYMPTOM: \"Have you ever had sinus problems before?\" If Yes, ask: \"When was the last time?\" and \"What happened that time?\"       Does happen every year per patient she usually need a z pack to \" Kick it\"    5. NASAL CONGESTION: \"Is the nose blocked?\" If Yes, ask: \"Can you open it or must you breathe through your mouth?\"      Yes nasal congestion having no trouble breathing  6. NASAL DISCHARGE: \"Do you have discharge from your nose?\" If so ask, \"What color?\"      Yellow discharge  7. FEVER: \"Do you have a fever?\" If Yes, ask: \"What is it, how was it measured, and when did it start?\"       Low grade fever of 100.1 F  8. OTHER SYMPTOMS: \"Do you have any other symptoms?\" (e.g., sore throat, cough, earache, difficulty breathing)      Cough and sore throat  9. PREGNANCY: \"Is there any chance you are pregnant?\" \"When was your last menstrual period?\"      no    Protocols used: Sinus Pain or " Congestion-Adult-[place]

## 2025-07-08 ENCOUNTER — RA CDI HCC (OUTPATIENT)
Dept: OTHER | Facility: HOSPITAL | Age: 50
End: 2025-07-08

## 2025-07-08 NOTE — PROGRESS NOTES
HCC coding opportunities          Chart Reviewed number of suggestions sent to Provider: 1  I11.0, I13.0     Patients Insurance        Commercial Insurance: [employer]

## 2025-07-30 ENCOUNTER — OFFICE VISIT (OUTPATIENT)
Dept: FAMILY MEDICINE CLINIC | Facility: CLINIC | Age: 50
End: 2025-07-30
Payer: COMMERCIAL

## 2025-07-30 VITALS
WEIGHT: 235 LBS | TEMPERATURE: 97.2 F | SYSTOLIC BLOOD PRESSURE: 132 MMHG | DIASTOLIC BLOOD PRESSURE: 84 MMHG | BODY MASS INDEX: 33.64 KG/M2 | HEART RATE: 65 BPM | OXYGEN SATURATION: 96 % | HEIGHT: 70 IN

## 2025-07-30 DIAGNOSIS — Z00.00 ANNUAL PHYSICAL EXAM: Primary | ICD-10-CM

## 2025-07-30 DIAGNOSIS — R73.9 ELEVATED RANDOM BLOOD GLUCOSE LEVEL: ICD-10-CM

## 2025-07-30 DIAGNOSIS — E78.2 MIXED HYPERLIPIDEMIA: ICD-10-CM

## 2025-07-30 DIAGNOSIS — Z01.419 ENCOUNTER FOR ANNUAL ROUTINE GYNECOLOGICAL EXAMINATION: ICD-10-CM

## 2025-07-30 DIAGNOSIS — E66.9 OBESITY (BMI 30-39.9): ICD-10-CM

## 2025-07-30 PROCEDURE — 99396 PREV VISIT EST AGE 40-64: CPT | Performed by: FAMILY MEDICINE

## 2025-08-20 ENCOUNTER — TELEPHONE (OUTPATIENT)
Age: 50
End: 2025-08-20

## 2025-08-20 DIAGNOSIS — E04.1 THYROID NODULE: Primary | ICD-10-CM
